# Patient Record
Sex: MALE | Race: WHITE | NOT HISPANIC OR LATINO | Employment: FULL TIME | URBAN - METROPOLITAN AREA
[De-identification: names, ages, dates, MRNs, and addresses within clinical notes are randomized per-mention and may not be internally consistent; named-entity substitution may affect disease eponyms.]

---

## 2017-11-09 ENCOUNTER — OFFICE VISIT - HEALTHEAST (OUTPATIENT)
Dept: FAMILY MEDICINE | Facility: CLINIC | Age: 37
End: 2017-11-09

## 2017-11-09 DIAGNOSIS — Z00.00 HEALTH CARE MAINTENANCE: ICD-10-CM

## 2017-11-09 RX ORDER — RIBOFLAVIN (VITAMIN B2) 100 MG
1 TABLET ORAL 3 TIMES DAILY
Status: SHIPPED | COMMUNITY
Start: 2017-11-09 | End: 2021-07-27

## 2017-11-09 RX ORDER — CHLORAL HYDRATE 500 MG
2 CAPSULE ORAL DAILY
Status: SHIPPED | COMMUNITY
Start: 2017-11-09 | End: 2021-07-27

## 2017-11-09 ASSESSMENT — MIFFLIN-ST. JEOR: SCORE: 1813.46

## 2018-12-13 ENCOUNTER — OFFICE VISIT - HEALTHEAST (OUTPATIENT)
Dept: FAMILY MEDICINE | Facility: CLINIC | Age: 38
End: 2018-12-13

## 2018-12-13 DIAGNOSIS — Z23 IMMUNIZATION DUE: ICD-10-CM

## 2018-12-13 DIAGNOSIS — Z00.00 HEALTH CARE MAINTENANCE: ICD-10-CM

## 2018-12-13 LAB
ALBUMIN SERPL-MCNC: 4.1 G/DL (ref 3.5–5)
ALP SERPL-CCNC: 67 U/L (ref 45–120)
ALT SERPL W P-5'-P-CCNC: 38 U/L (ref 0–45)
ANION GAP SERPL CALCULATED.3IONS-SCNC: 8 MMOL/L (ref 5–18)
AST SERPL W P-5'-P-CCNC: 35 U/L (ref 0–40)
BILIRUB SERPL-MCNC: 0.7 MG/DL (ref 0–1)
BUN SERPL-MCNC: 18 MG/DL (ref 8–22)
CALCIUM SERPL-MCNC: 9.5 MG/DL (ref 8.5–10.5)
CHLORIDE BLD-SCNC: 102 MMOL/L (ref 98–107)
CHOLEST SERPL-MCNC: 208 MG/DL
CO2 SERPL-SCNC: 28 MMOL/L (ref 22–31)
CREAT SERPL-MCNC: 0.97 MG/DL (ref 0.7–1.3)
ERYTHROCYTE [DISTWIDTH] IN BLOOD BY AUTOMATED COUNT: 10.6 % (ref 11–14.5)
FASTING STATUS PATIENT QL REPORTED: YES
GFR SERPL CREATININE-BSD FRML MDRD: >60 ML/MIN/1.73M2
GLUCOSE BLD-MCNC: 95 MG/DL (ref 70–125)
HCT VFR BLD AUTO: 48.6 % (ref 40–54)
HDLC SERPL-MCNC: 68 MG/DL
HGB BLD-MCNC: 16.6 G/DL (ref 14–18)
LDLC SERPL CALC-MCNC: 124 MG/DL
MCH RBC QN AUTO: 31.4 PG (ref 27–34)
MCHC RBC AUTO-ENTMCNC: 34.2 G/DL (ref 32–36)
MCV RBC AUTO: 92 FL (ref 80–100)
PLATELET # BLD AUTO: 187 THOU/UL (ref 140–440)
PMV BLD AUTO: 7.7 FL (ref 7–10)
POTASSIUM BLD-SCNC: 4.1 MMOL/L (ref 3.5–5)
PROT SERPL-MCNC: 6.7 G/DL (ref 6–8)
RBC # BLD AUTO: 5.29 MILL/UL (ref 4.4–6.2)
SODIUM SERPL-SCNC: 138 MMOL/L (ref 136–145)
TRIGL SERPL-MCNC: 80 MG/DL
WBC: 6.3 THOU/UL (ref 4–11)

## 2018-12-13 ASSESSMENT — MIFFLIN-ST. JEOR: SCORE: 1763.56

## 2018-12-14 LAB
25(OH)D3 SERPL-MCNC: 46.4 NG/ML (ref 30–80)
25(OH)D3 SERPL-MCNC: 46.4 NG/ML (ref 30–80)

## 2019-01-26 ENCOUNTER — RECORDS - HEALTHEAST (OUTPATIENT)
Dept: ADMINISTRATIVE | Facility: OTHER | Age: 39
End: 2019-01-26

## 2020-03-09 ENCOUNTER — OFFICE VISIT - HEALTHEAST (OUTPATIENT)
Dept: FAMILY MEDICINE | Facility: CLINIC | Age: 40
End: 2020-03-09

## 2020-03-09 DIAGNOSIS — Z00.00 ROUTINE GENERAL MEDICAL EXAMINATION AT A HEALTH CARE FACILITY: ICD-10-CM

## 2020-03-09 DIAGNOSIS — Z76.89 ENCOUNTER TO ESTABLISH CARE: ICD-10-CM

## 2020-03-09 LAB
CHOLEST SERPL-MCNC: 201 MG/DL
FASTING STATUS PATIENT QL REPORTED: YES
FASTING STATUS PATIENT QL REPORTED: YES
GLUCOSE BLD-MCNC: 100 MG/DL (ref 70–125)
HDLC SERPL-MCNC: 60 MG/DL
HGB BLD-MCNC: 17 G/DL (ref 14–18)
LDLC SERPL CALC-MCNC: 128 MG/DL
TRIGL SERPL-MCNC: 64 MG/DL

## 2020-03-09 ASSESSMENT — MIFFLIN-ST. JEOR: SCORE: 1812.43

## 2020-10-16 ENCOUNTER — RECORDS - HEALTHEAST (OUTPATIENT)
Dept: ADMINISTRATIVE | Facility: OTHER | Age: 40
End: 2020-10-16

## 2021-05-31 VITALS — WEIGHT: 202.4 LBS | BODY MASS INDEX: 29.98 KG/M2 | HEIGHT: 69 IN

## 2021-06-02 VITALS — WEIGHT: 191.4 LBS | BODY MASS INDEX: 28.35 KG/M2 | HEIGHT: 69 IN

## 2021-06-04 VITALS
BODY MASS INDEX: 29.82 KG/M2 | HEIGHT: 69 IN | SYSTOLIC BLOOD PRESSURE: 120 MMHG | HEART RATE: 68 BPM | DIASTOLIC BLOOD PRESSURE: 84 MMHG | WEIGHT: 201.3 LBS

## 2021-06-14 ENCOUNTER — OFFICE VISIT - HEALTHEAST (OUTPATIENT)
Dept: FAMILY MEDICINE | Facility: CLINIC | Age: 41
End: 2021-06-14

## 2021-06-14 DIAGNOSIS — Z00.00 ROUTINE GENERAL MEDICAL EXAMINATION AT A HEALTH CARE FACILITY: ICD-10-CM

## 2021-06-14 DIAGNOSIS — R10.31 RIGHT GROIN PAIN: ICD-10-CM

## 2021-06-14 DIAGNOSIS — M75.42 IMPINGEMENT SYNDROME OF SHOULDER REGION, LEFT: ICD-10-CM

## 2021-06-14 LAB
HBA1C MFR BLD: 5.3 %
HGB BLD-MCNC: 17 G/DL (ref 14–18)

## 2021-06-14 ASSESSMENT — MIFFLIN-ST. JEOR: SCORE: 1936.6

## 2021-06-14 NOTE — PROGRESS NOTES
Subjective:      Rocael Amos is a 37 y.o. male who presents today for physical exam    Mother recently diagnosed with stage 1 breast cancer with upcoming surgery next week at [place] and will start radiation.     Declines influenza vaccination today.   Patient is not fasting today.   Does have labwork completed annually through employer @ [place].    He does not have any acute concerns.     Reviewed and updated chart below.   No Known Allergies  No past medical history on file.  Past Surgical History:   Procedure Laterality Date     [place] KNEE SCOPE,DIAGNOSTIC      Description: Arthroscopy Knee Left;  Recorded: 09/18/2014;     SHOULDER SURGERY Right      Social History     Social History     Marital status: Single     Spouse name: N/A     Number of children: N/A     Years of education: N/A     Occupational History      [employer]     Social History Main Topics     Smoking status: Never Smoker     Smokeless tobacco: Never Used     Alcohol use Yes      Comment: 0-1/week     Drug use: No     Sexual activity: Yes     Partners: Female     Other Topics Concern     Not on file     Social History Narrative     Family History   Problem Relation Age of Onset     Hypertension Mother      Breast cancer Mother      Hypertension Father      Current Outpatient Prescriptions   Medication Sig Dispense Refill     glucosamine-chondroitin 500-400 mg tablet Take 1 tablet by mouth 3 (three) times a day.       MAGNESIUM ORAL Take 1,000 mg by mouth.       multivitamin therapeutic tablet Take 1 tablet by mouth daily.       OMEGA-3/DHA/EPA/FISH OIL (FISH OIL-OMEGA-3 FATTY ACIDS) 300-1,000 mg capsule Take 2 g by mouth daily.       No current facility-administered medications for this visit.      Patient Active Problem List    Diagnosis Date Noted     Health care maintenance 11/04/2016     Sebaceous Cyst      Review of Systems   Constitutional: Negative.   HENT: Negative.   Eyes: Negative.   Respiratory: Negative.   Cardiovascular:  "Negative.   Gastrointestinal: Negative.   Endocrine: Negative.   Genitourinary: Negative.   Musculoskeletal: Negative.   Skin: Negative.   Allergic/Immunologic: Negative.   Neurological: Negative.   Hematological: Negative.   Psychiatric/Behavioral: Negative.     Objective:    Physical Exam   /86 (Patient Site: Left Arm, Patient Position: Sitting, Cuff Size: Adult Regular)  Pulse 68  Ht 5' 9\" (1.753 m)  Wt 202 lb 6.4 oz (91.8 kg)  BMI 29.89 kg/m2    Constitutional: Alert and oriented x3, well nourished without any acute distress  HENT:   Right Ear: External ear normal.   Left Ear: External ear normal.   Nose: Nose normal.   Mouth/Throat: Oropharynx is clear and moist.   Eyes: Conjunctivae and EOM are normal. Pupils are equal, round, and reactive to light. Right eye exhibits no discharge. Left eye exhibits no discharge.   Neck: No thyromegaly present.   Lymphadenopathy: Without palpable lymphadenopathy  Cardiovascular: Normal S1 and S2. Regular rate, rhythm and no murmur, rubs or gallops. Palpable distal pulses bilaterally.  Pulmonary/Chest: Normal effort. Lungs clear to auscultation in all lobes. Without wheezing, rhonchi or crackles.    Abdominal: Soft, non tenderness. There is no rebound or guarding. Bowel sounds x4. Without organomegaly. No CVA tenderness.  Musculoskeletal: 5/5 strength  And full ROM in all extremities. No joint swelling or deformity  Neurological: Cranial nerves intact, without deficit. Without numbness, tingling or paresthesia. Normal reflexes  Skin: Dry and intact; without rashes or lesions.   Psychiatric: Normal mood and affect.     Assessment/Plan:      1. Health care maintenance  Reviewed 2016 labs which were normal.   Patient does have labs assessed through employer, advised patient to upload current records or to drop off records so file can be updated.   Per patient, all his last labs have been stable.   Vitamin D level was elevated in 2016. Patient did stop taking his daily " vitamin D supplementation. Declined offer to recheck today.   Declined influenza vaccination today.   Reviewed preventative care - we discussed healthy lifestyle, nutrition, cardiovascular risk reduction, self care, safety, self testicular exams, sunscreen, and seatbelt screening.  Recommended annual physical exam or sooner for any acute concerns.   Patient agreed with plan    Office visit and charting was completed with Zee Snyder, Student IKER Fernando CNP  11/9/2017

## 2021-06-15 LAB
CHOLEST SERPL-MCNC: 243 MG/DL
FASTING STATUS PATIENT QL REPORTED: NO
HDLC SERPL-MCNC: 51 MG/DL
LDLC SERPL CALC-MCNC: 158 MG/DL
TRIGL SERPL-MCNC: 169 MG/DL

## 2021-06-22 NOTE — PROGRESS NOTES
Assessment/Plan:        Diagnoses and all orders for this visit:    Health care maintenance.  Patient is fasting today.  Will order labs today.  Influenza and Td vaccines given today.   Reviewed preventative care - we discussed healthy lifestyle, nutrition, cardiovascular risk reduction, self care, safety, self testicular exams, sunscreen, and seatbelt screening.  Recommended annual physical exam or sooner for any acute concerns.   Discussed red flags that would warrant urgent evaluation.  Patient agreed with plan  -     HM2(CBC w/o Differential)  -     Comprehensive Metabolic Panel  -     Lipid Cascade  -     Vitamin D, Total (25-Hydroxy)      -     Td, Preservative Free (green label)    Subjective:    Patient ID: Rocael Amos is a 38 y.o. male.    HPI  Rocael presents at clinic today for annual physical exam.    He does not have any acute concerns.     Review of Systems   Constitutional: Negative.    HENT: Negative.    Eyes: Negative.    Cardiovascular: Negative.    Gastrointestinal: Negative.    Endocrine: Negative.    Genitourinary: Negative.    Musculoskeletal: Negative.    Skin: Negative.    Neurological: Negative.    Psychiatric/Behavioral: Negative.        Reviewed and updated chart below.   No Known Allergies  No past medical history on file.  Past Surgical History:   Procedure Laterality Date     [place] KNEE SCOPE,DIAGNOSTIC      Description: Arthroscopy Knee Left;  Recorded: 09/18/2014;     SHOULDER SURGERY Right      Social History     Social History     Marital status: Single     Spouse name: N/A     Number of children: N/A     Years of education: N/A     Occupational History      [employer]     Social History Main Topics     Smoking status: Never Smoker     Smokeless tobacco: Never Used     Alcohol use Yes      Comment: 0-1/week     Drug use: No     Sexual activity: Yes     Partners: Female     Other Topics Concern     Not on file     Social History Narrative     Family History   Problem Relation Age of  Onset     Hypertension Mother      Breast cancer Mother      Hypertension Father            Objective:    Physical Exam   Constitutional: He is oriented to person, place, and time. He appears well-developed and well-nourished.   HENT:   Head: Normocephalic and atraumatic.   Eyes: Conjunctivae are normal. Pupils are equal, round, and reactive to light.   Neck: Normal range of motion.   Cardiovascular: Normal rate, regular rhythm and normal heart sounds.   Pulmonary/Chest: Effort normal and breath sounds normal.   Abdominal: Soft.   Musculoskeletal: Normal range of motion.   Neurological: He is alert and oriented to person, place, and time.   Skin: Skin is warm and dry.   Psychiatric: He has a normal mood and affect. Judgment normal.           Current Outpatient Medications   Medication Sig Dispense Refill     glucosamine-chondroitin 500-400 mg tablet Take 1 tablet by mouth 3 (three) times a day.       multivitamin therapeutic tablet Take 1 tablet by mouth daily.       OMEGA-3/DHA/EPA/FISH OIL (FISH OIL-OMEGA-3 FATTY ACIDS) 300-1,000 mg capsule Take 2 g by mouth daily.       MAGNESIUM ORAL Take 1,000 mg by mouth.       No current facility-administered medications for this visit.      Vitals:    12/13/18 1118   BP: 120/76   Pulse: 68

## 2021-06-23 ENCOUNTER — RECORDS - HEALTHEAST (OUTPATIENT)
Dept: ADMINISTRATIVE | Facility: OTHER | Age: 41
End: 2021-06-23

## 2021-06-26 NOTE — PATIENT INSTRUCTIONS - HE
Acupuncture:  Acupuncture can help some people with various ailments such as pain, allergies, sleep issues, stress, anxiety, depression, and improve their overall wellness.   Some insurance will cover acupuncture. If your insurance covers it, please call our clinic and we will put in a formal referral. Otherwise, no referral needed generally. Just call to schedule at any of these sites:   [facility]:  Phone:  [phone]  [id number] [place] [address]    [facility]  [address] [place] [address]  Phone: [phone], [phone]  [email or web address]  Accepts [facility], [facility], [employer], [facility], [facility], and [facility]; other discount options for self pay available     [facility]   [address], [address], [place] [address]   Phone: [phone]

## 2021-06-27 ENCOUNTER — HEALTH MAINTENANCE LETTER (OUTPATIENT)
Age: 41
End: 2021-06-27

## 2021-06-28 NOTE — PROGRESS NOTES
"Progress Notes by Porsha Monahan MD at 3/9/2020  9:40 AM     Author: Porsha Monahan MD Service: -- Author Type: Physician    Filed: 3/9/2020 10:21 AM Encounter Date: 3/9/2020 Status: Signed    : Porsha Monahan MD (Physician)       MALE PREVENTATIVE EXAM    Assessment and Plan:       Rocael was seen today for annual exam and nevus.    Encounter to establish care    Routine general medical examination at a health care facility  -     Hemoglobin  -     Glucose  -     Lipid Cascade    Otherwise healthy. Reviewed ABCDEs of moles; his current mole appears benign and we will continue to monitor.     Next follow up:  Return in about 1 year (around 3/9/2021) for Annual physical.    Immunization Review  Adult Imm Review: No immunizations due today    I discussed the following with the patient:   Adult Healthy Living: Importance of regular exercise  Healthy nutrition  Getting adequate sleep  Stress management  Use of seat belts  Distracted driving  Sporting equipment safety  STI prevention  Supplement use  Herbal medications/alternative medical therapies    I have had an Advance Directives discussion with the patient.    Subjective:   Chief Complaint: Rocael Amos is an 39 y.o. male here for a preventative health visit.     HPI:    Chief Complaint   Patient presents with   ? Annual Exam     fasting   ? Nevus     would like a mole looked at on chest     Mole on anterior chest- been there for years but last summer noticed a grey hair from center, normally just brown hair. No change in the mole otherwise, doesn't bother him.     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   Typical weight is 185lbs, currently building muscle to 201lbs.   Eats clean. High protein and veggie diet.  Counts grams of protein (225g), eats 6 times per day (5oz chicken/cod/egg each meal) with regular veggies. 25g carbs per day.     He reports \"horrible teeth\"- root canals yearly- last year had an infection (ER visit). " Sonic toothbrush, floss, regular q6 month.    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    Exercises 1-2hr/day. Competitive body building.     No alcohol or smoking.    Currently single, Girlfriend of 6 years and he broke up 1 year ago 2019. They are still close and her kids visit.    He has cats.     Never smoker. No alcohol    Porsha Monahan MD     The 10-year ASCVD risk score (Jodieliza PEDROZA Jr., et al., 2013) is: 0.7%    Values used to calculate the score:      Age: 39 years      Sex: Male      Is Non- : No      Diabetic: No      Tobacco smoker: No      Systolic Blood Pressure: 120 mmHg      Is BP treated: No      HDL Cholesterol: 68 mg/dL      Total Cholesterol: 208 mg/dL    Healthy Habits  Are you taking a daily aspirin? No  Do you typically exercising at least 40 min, 3-4 times per week?  Yes  Do you usually eat at least 4 servings of fruit and vegetables a day, include whole grains and fiber and avoid regularly eating high fat foods? Yes  Have you had an eye exam in the past two years? Yes  Do you see a dentist twice per year? Yes  Do you have any concerns regarding sleep? No    Safety Screen  If you own firearms, are they secured in a locked gun cabinet or with trigger locks? The patient does not own any firearms  Do you feel you are safe where you are living?: Yes (3/9/2020  9:46 AM)  Do you feel you are safe in your relationship(s)?: Yes (3/9/2020  9:46 AM)      Review of Systems:  Please see above.  The rest of the review of systems are negative for all systems.     Cancer Screening     Patient has no health maintenance due at this time          Patient Care Team:  Porsha Monahan MD as PCP - General (Family Medicine)  Rosetta Fernando NP as Assigned PCP        History     Reviewed By Date/Time Sections Reviewed    Porsha Monahan MD  "3/9/2020 10:17 AM Medical, Surgical, Tobacco, Family    Porsha Monahan MD 3/9/2020 10:07 AM Social Documentation    Eri Radha AISLINN STEPHENSON 3/9/2020  9:46 AM Tobacco            Objective:   Vital Signs:   Visit Vitals  /84 (Patient Site: Left Arm, Patient Position: Sitting, Cuff Size: Adult Large)   Pulse 68   Ht 5' 9.25\" (1.759 m)   Wt 201 lb 4.8 oz (91.3 kg)   BMI 29.51 kg/m           PHYSICAL EXAM  Physical Exam:  Constitutional: Patient is oriented to person, place, and time. Patient appears well-developed and well-nourished. No distress.   Head: Normocephalic and atraumatic.   Ears: TMs normal bilaterally   Nose: no discharge or polyps  Mouth/Throat: Oropharynx is clear and moist. No oropharyngeal exudate. Dentition noted with crowns.  Eyes: Conjunctivae and EOM are normal. Pupils are equal, round, and reactive to light. No scleral icterus or discharge.  Neck: Neck supple. No JVD present. No tracheal deviation. No thyromegaly.     Cardiovascular: Normal rate, regular rhythm, normal heart sounds and intact distal pulses. No murmur heard.   Pulmonary/Chest: Effort normal and breath sounds are clear to auscultation bilaterally.  Abdominal: Soft. Bowel sounds are normal. Nodistension and no mass.  There is no tenderness.   : pt declined exam   Lymphadenopathy:  No cervical adenopathy.   Neurological: Alert and oriented to person, place, and time. 2+ patellar DTRs. No gross cranial nerve deficit. Coordination normal.   Skin: Skin is warm and dry. No rash noted.  Single mole pt points at is located at right anterior chest wall- 6mm in size, brown macule, with healthy hairs growing from it. No irregular border, no change in pigmentation.  Psychiatric:  Normal mood and affect. Judgment and thought content normal.   Speech is regular rate and rhythm.         The 10-year ASCVD risk score ([place] KASIA Jr., et al., 2013) is: 0.7%    Values used to calculate the score:      Age: 39 years      Sex: Male      Is " Non- : No      Diabetic: No      Tobacco smoker: No      Systolic Blood Pressure: 120 mmHg      Is BP treated: No      HDL Cholesterol: 68 mg/dL      Total Cholesterol: 208 mg/dL         Medication List          Accurate as of March 9, 2020 10:19 AM. If you have any questions, ask your nurse or doctor.            CONTINUE taking these medications    fish oil-omega-3 fatty acids 300-1,000 mg capsule  INSTRUCTIONS:  Take 2 g by mouth daily.        glucosamine-chondroitin 500-400 mg tablet  INSTRUCTIONS:  Take 1 tablet by mouth 3 (three) times a day.        multivitamin therapeutic tablet  INSTRUCTIONS:  Take 1 tablet by mouth daily.           STOP taking these medications    MAGNESIUM ORAL  Stopped by:  Porsha Monahan MD            Additional Screenings Completed Today:   PHQ2 is 0

## 2021-07-04 NOTE — PROGRESS NOTES
Progress Notes by Porsha Monahan MD at 6/14/2021  5:20 PM     Author: Porsha Monahan MD Service: -- Author Type: Physician    Filed: 6/14/2021  9:03 PM Encounter Date: 6/14/2021 Status: Signed    : Porsha Monahan MD (Physician)       MALE PREVENTATIVE EXAM    Assessment and Plan:     Patient has been advised of split billing requirements and indicates understanding: Yes    Rocael was seen today for annual exam, shoulder pain and groin pain.    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  Options for imaging though declines at this time and will start with physical therapy exercises he has learned previously and I will also place a referral should he desire this down the road.  He is also going to consider acupuncture.    -     Ambulatory referral to Adult PT- Internal    Right groin pain  Very tight hamstring muscles with limited range of motion.  Negative ALEX and FADIR testing.   No pain of the lower back specifically today however historically has had some right-sided low back pain and sciatica type symptoms.  No palpable inguinal hernia.  Testicular exam was normal.  Reviewed differential for groin pain which includes muscular versus sciatica versus hip pathology versus inguinal or femoral hernia or testicular origin.   Given onset of symptoms are particularly around exercise such as running or biking that he does not do regularly we have discussed increasing dynamic stretching and cooldown stretching to increase flexibility and he will work with  his chiropractor for this as well.  If symptoms do not improve with these conservative measures we will consider follow-up ultrasound for hernia or testicular assessment.      Next follow up:  Return in about 1 year (around 6/14/2022) for Annual physical, Recheck if sx not improved or sooner if worsening.    Immunization Review  Adult Imm Review: No immunizations due today    I discussed the following with the patient:   Adult Healthy Living: Importance of regular exercise  Healthy nutrition  Weight loss referral options  Getting adequate sleep  Stress management  Supplement use  Herbal medications/alternative medical therapies      Subjective:   Chief Complaint: Rocael Amos is an 41 y.o. male here for a preventative health visit.    Patient has been advised of split billing requirements and indicates understanding: Yes  HPI:    Chief Complaint   Patient presents with   ? Annual Exam     Not fasting   ? Shoulder Pain     left shoulder, pinching feeling, only gets it when doing chest exercises, does lift heavy   ? Groin Pain     right side, seem to wrap around to lower back, bothersome after biking but not during, possible hernia?        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 Anticipates cholesterol will be up.  230lbs currently, planning to cut to 185lbs.    Left shoulder pain for a few months- icing it nightly; stretching and trying to do anything possible to heal it. Pinching feeling only with chest exercises or front raises (but not side raises) of the shoulder- next day shoulder will be particularly stiff and takes NSAIDs, and biofreeze for 24-48hr until it gets better    Has tried to lower weight and higher reps (tried 15lb instead fo 25lb dumbells); for chest he would do 45lb instead of 60lbs for the chest flies and exercises    Has tried some shoulder exercises for a labral tear that he had on the right side in the past    Right sided groin pain: right testicle pain and groin area and  "wraps to the back- worse after biking but not during biking. Occasional but can come up for other times.  He doesn't do a lot of biking or running. Not noticing any budge as a hernia.     Had a friend die of testicular cancer recently so he is rightfully concerned.    Social History     Social History Narrative     for a [employer]. 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    Exercises 1-2hr/day. Competitive body building.     No alcohol or smoking.    Currently single    He has cats.     Never smoker. No alcohol    Porsha Monahan MD       Healthy Habits  Are you taking a daily aspirin? No  Do you typically exercising at least 40 min, 3-4 times per week?  Yes  Do you usually eat at least 4 servings of fruit and vegetables a day, include whole grains and fiber and avoid regularly eating high fat foods? Yes  Have you had an eye exam in the past two years? Yes  Do you see a dentist twice per year? Yes  Do you have any concerns regarding sleep? No    Safety Screen  If you own firearms, are they secured in a locked gun cabinet or with trigger locks? The patient does not own any firearms  No data recorded    Review of Systems:  Please see above.  The rest of the review of systems are negative for all systems.     Cancer Screening     Patient has no health maintenance due at this time          Patient Care Team:  Porsha Monahan MD as PCP - General (Family Medicine)  Porsha Monahan MD as Assigned PCP        History     Reviewed By Date/Time Sections Reviewed    Porsha Monahan MD 6/14/2021  8:58 PM Medical, Surgical, Tobacco, Family    Porsha Monahan MD 6/14/2021  5:41 PM Social Documentation    Radha Hwang LPN 6/14/2021  4:54 PM Tobacco            Objective:   Vital Signs:   Visit Vitals  /62 (Patient Site: Left Arm, Patient Position: Sitting, Cuff Size: Adult Large)   Pulse 76   Ht 5' 8.5\" (1.74 m)   Wt (!) 232 lb 6.4 oz " (105.4 kg)   BMI 34.82 kg/m           PHYSICAL EXAM  Physical Exam:  Constitutional: Patient is oriented to person, place, and time. Patient appears well-developed and well-nourished. No distress.   Head: Normocephalic and atraumatic.   Ears: TMs normal bilaterally   Nose: no discharge or polyps  Mouth/Throat: Oropharynx is clear and moist. No oropharyngeal exudate.   Eyes: Conjunctivae and EOM are normal. Pupils are equal, round, and reactive to light. No scleral icterus or discharge.  Neck: Neck supple. No JVD present. No tracheal deviation. No thyromegaly.     Cardiovascular: Normal rate, regular rhythm, normal heart sounds and intact distal pulses. No murmur heard.   Pulmonary/Chest: Effort normal and breath sounds are clear to auscultation bilaterally.  Abdominal: Soft. Bowel sounds are normal. Nodistension and no mass.  There is no tenderness.   : No inguinal or femoral hernia on valsalva maneuver. Testes are normal bilaterally, nontender with no palpable lumps; no erythema or swelling.   Lymphadenopathy:  No cervical adenopathy.   Neurological: Alert and oriented to person, place, and time. 2+ patellar DTRs. No gross cranial nerve deficit. Coordination normal.   Skin: Skin is warm and dry. No rash noted.   Psychiatric:  Normal mood and affect. Judgment and thought content normal.   Speech is regular rate and rhythm.   MSK: + impingement signs of the left shoulder (Neers, Arguello, painful arc); right shoulder is normal  Right hip nontender to palpation; there is reduced hip flexion/extension and internal/external rotation bilaterally, negative ALEX and FADIR testing     The 10-year ASCVD risk score (Jodi KASIA Jr., et al., 2013) is: 0.8%    Values used to calculate the score:      Age: 41 years      Sex: Male      Is Non- : No      Diabetic: No      Tobacco smoker: No      Systolic Blood Pressure: 102 mmHg      Is BP treated: No      HDL Cholesterol: 60 mg/dL      Total Cholesterol: 201  mg/dL         Medication List          Accurate as of June 14, 2021  9:02 PM. If you have any questions, ask your nurse or doctor.            CONTINUE taking these medications    fish oil-omega-3 fatty acids 300-1,000 mg capsule  INSTRUCTIONS: Take 2 g by mouth daily.        glucosamine-chondroitin 500-400 mg tablet  INSTRUCTIONS: Take 1 tablet by mouth 3 (three) times a day.        multivitamin therapeutic tablet  INSTRUCTIONS: Take 1 tablet by mouth daily.               Additional Screenings Completed Today:

## 2021-07-06 VITALS
DIASTOLIC BLOOD PRESSURE: 62 MMHG | BODY MASS INDEX: 34.42 KG/M2 | HEART RATE: 76 BPM | SYSTOLIC BLOOD PRESSURE: 102 MMHG | WEIGHT: 232.4 LBS | HEIGHT: 69 IN

## 2021-07-27 ENCOUNTER — OFFICE VISIT (OUTPATIENT)
Dept: FAMILY MEDICINE | Facility: CLINIC | Age: 41
End: 2021-07-27
Payer: COMMERCIAL

## 2021-07-27 VITALS
OXYGEN SATURATION: 96 % | SYSTOLIC BLOOD PRESSURE: 118 MMHG | BODY MASS INDEX: 33.32 KG/M2 | DIASTOLIC BLOOD PRESSURE: 64 MMHG | HEART RATE: 74 BPM | WEIGHT: 222.4 LBS | TEMPERATURE: 98 F

## 2021-07-27 DIAGNOSIS — R05.9 COUGH: Primary | ICD-10-CM

## 2021-07-27 PROCEDURE — 99213 OFFICE O/P EST LOW 20 MIN: CPT | Performed by: FAMILY MEDICINE

## 2021-07-27 RX ORDER — FLUTICASONE PROPIONATE 50 MCG
SPRAY, SUSPENSION (ML) NASAL
COMMUNITY
Start: 2021-06-23

## 2021-07-27 RX ORDER — OMEPRAZOLE 20 MG/1
20 TABLET, DELAYED RELEASE ORAL DAILY
Qty: 30 TABLET | Refills: 1 | Status: SHIPPED | OUTPATIENT
Start: 2021-07-27 | End: 2022-10-25

## 2021-07-27 NOTE — PROGRESS NOTES
Assessment/plan   Rocael Amos is a 41 year old male who is established to my practice.    Rocael was seen today for cough.    Diagnoses and all orders for this visit:    Cough  Chronic cough described as intermittent coughing attacks, no posttussive emesis.  Otherwise afebrile, feels really well.  DDx reviewed for chronic cough/upper airway cough syndrome.  Has already completed a course of fluticasone with some improvement but we did review possibility of heartburn/reflux playing a role especially as he is had a notable increase in the amount of food he has been eating as he is bulking for the next competition.  Given the lungs were clear and SPO2 normal, he is comfortable with the plan to do a course of omeprazole for 2 to 4 weeks and if not improving he can send a message and we will order an x-ray for ruling out a pneumonia at that point.  He is well past infectious.  If it had been Covid though this has been his only symptom we did review CDC recommendations despite vaccination with to still be testing for any new cough.  -     omeprazole (PRILOSEC OTC) 20 MG EC tablet; Take 1 tablet (20 mg) by mouth daily        Follow up: Return in about 4 weeks (around 8/24/2021) for Recheck if not improving.      Porsha Monahan MD    Subjective:      HPI: Rocael Amos is a 41 year old male who is here for:    Chief Complaint   Patient presents with     Cough     x 1 month.  Pt was seen at Urgent Care and was given a nasal spray and Benzonatate which have helped slightly.       Cough: He was seen on 6/23/2021 at the [facility] clinic for a cough that had started 6 days prior at that point- productive cough with mucus at that time.  At that visit he was offered Tessalon Perles and flonase.  There is some conversation about an allergic rhinitis component. The cough did improve a little but still has a cough during the day- coughing attacks that come and go every few hours.  We are now just over 1 month of coughing.    Has  tried cough drops.    Family hx of PNA in his dad    When working out, if the HR is above 140 he will feel the cough come on. But other less intense work outs do not cause the cough.  But can also happen at rest when sitting or talking at a meeting.        Has gone to the gym daily and nobody there seems to have gotten sick (works out with the same people daily)    No heartburn or reflux lately but he is bulking for the next competition and He is eating a lot now- 6-7 meals a day.   Will clear throat more in the mronings    Hx of allergies but no asthma        Review of Systems:  No fever, shortness of breath, wheezing.  No rash  No change in taste/sense of smell  No hematemesis feeling well, no swollen lymph nodes anywhere  The shoulder and hamstring issues we reviewed last month are starting to improve with stretching.  12 point comprehensive review of systems was negative except as noted and HPI     Social History:  Social History     Social History Narrative     for a [employer]. 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  Exercises 1-2hr/day. Competitive body building.   No alcohol or smoking.  Currently single  He has cats.   Never smoker. No alcohol  Lucio Monahan MD         Medical History:  Patient Active Problem List   Diagnosis     Health care maintenance     Ulnar neuropathy at elbow     Right shoulder pain     Past Medical History:   Diagnosis Date     Right shoulder pain 5/18/2014    Overview:  Scapular dyskinesis, and working on exercises for this as the primary cause of his symptoms. Thorough work up with [facility] 5/2014 with MRI scans of the right shoulder showed some labral degeneration, and brachial plexus MRI negative for thoracic outlet syndrome as the cause of his symptoms.     Past Surgical History:   Procedure Laterality Date      KNEE SCOPE, DIAGNOSTIC      Description: Arthroscopy Knee Left;  Recorded: 09/18/2014;      SHOULDER SURGERY Right      Kiwi [kiwi extract]  Family History   Problem Relation Age of Onset     Hypertension Mother      Breast Cancer Mother 61.00     Hypertension Father      Colon Cancer No family hx of      Prostate Cancer No family hx of        Medications:  Current Outpatient Medications   Medication     fluticasone (FLONASE) 50 MCG/ACT nasal spray     omeprazole (PRILOSEC OTC) 20 MG EC tablet     No current facility-administered medications for this visit.         Imaging & Labs reviewed this visit: none      Objective:      Vitals:    07/27/21 1320   BP: 118/64   Pulse: 74   Temp: 98  F (36.7  C)   TempSrc: Oral   SpO2: 96%   Weight: 100.9 kg (222 lb 6.4 oz)       Physical Exam:     General: Alert, no acute distress.   HEENT: normocephalic conjunctivae are clear, Normal pearly TMs bilaterally without erythema, pus or fluid. Position and landmarks are normal.  Nose is clear.    Neck: supple without adenopathy or thyromegaly.  Lungs: Good aeration bilaterally. Clear to auscultation without wheezes, rales or rhonci.   Heart: regular rate and rhythm, normal S1 and S2, no murmurs  Abdomen: soft and nontender  Skin: clear without rash or lesions  Neuro: alert, interactive moving all extremities equally, normal muscle tone in all 4 extremities    Porsha Monahan MD

## 2021-08-18 DIAGNOSIS — R05.9 COUGH: ICD-10-CM

## 2021-10-17 ENCOUNTER — HEALTH MAINTENANCE LETTER (OUTPATIENT)
Age: 41
End: 2021-10-17

## 2021-12-10 ENCOUNTER — TRANSFERRED RECORDS (OUTPATIENT)
Dept: HEALTH INFORMATION MANAGEMENT | Facility: CLINIC | Age: 41
End: 2021-12-10
Payer: COMMERCIAL

## 2021-12-17 ENCOUNTER — TRANSFERRED RECORDS (OUTPATIENT)
Dept: HEALTH INFORMATION MANAGEMENT | Facility: CLINIC | Age: 41
End: 2021-12-17
Payer: COMMERCIAL

## 2022-07-23 ENCOUNTER — HEALTH MAINTENANCE LETTER (OUTPATIENT)
Age: 42
End: 2022-07-23

## 2022-10-01 ENCOUNTER — HEALTH MAINTENANCE LETTER (OUTPATIENT)
Age: 42
End: 2022-10-01

## 2022-10-25 ENCOUNTER — OFFICE VISIT (OUTPATIENT)
Dept: FAMILY MEDICINE | Facility: CLINIC | Age: 42
End: 2022-10-25
Payer: COMMERCIAL

## 2022-10-25 VITALS
HEART RATE: 76 BPM | DIASTOLIC BLOOD PRESSURE: 90 MMHG | WEIGHT: 233.3 LBS | HEIGHT: 70 IN | BODY MASS INDEX: 33.4 KG/M2 | SYSTOLIC BLOOD PRESSURE: 138 MMHG

## 2022-10-25 DIAGNOSIS — Z11.4 SCREENING FOR HIV (HUMAN IMMUNODEFICIENCY VIRUS): ICD-10-CM

## 2022-10-25 DIAGNOSIS — S86.011S ACHILLES TENDON TEAR, RIGHT, SEQUELA: ICD-10-CM

## 2022-10-25 DIAGNOSIS — Z11.59 NEED FOR HEPATITIS C SCREENING TEST: ICD-10-CM

## 2022-10-25 DIAGNOSIS — Z00.00 ROUTINE GENERAL MEDICAL EXAMINATION AT A HEALTH CARE FACILITY: Primary | ICD-10-CM

## 2022-10-25 DIAGNOSIS — R10.31 RIGHT GROIN PAIN: ICD-10-CM

## 2022-10-25 DIAGNOSIS — I82.441 ACUTE DEEP VEIN THROMBOSIS (DVT) OF TIBIAL VEIN OF RIGHT LOWER EXTREMITY (H): ICD-10-CM

## 2022-10-25 DIAGNOSIS — I82.451 ACUTE DEEP VEIN THROMBOSIS (DVT) OF RIGHT PERONEAL VEIN (H): ICD-10-CM

## 2022-10-25 LAB
CHOLEST SERPL-MCNC: 182 MG/DL
FASTING STATUS PATIENT QL REPORTED: ABNORMAL
GLUCOSE SERPL-MCNC: 102 MG/DL (ref 70–99)
HDLC SERPL-MCNC: 41 MG/DL
HGB BLD-MCNC: 15.7 G/DL (ref 13.3–17.7)
LDLC SERPL CALC-MCNC: 122 MG/DL
NONHDLC SERPL-MCNC: 141 MG/DL
TRIGL SERPL-MCNC: 96 MG/DL

## 2022-10-25 PROCEDURE — 80061 LIPID PANEL: CPT | Performed by: FAMILY MEDICINE

## 2022-10-25 PROCEDURE — 85018 HEMOGLOBIN: CPT | Performed by: FAMILY MEDICINE

## 2022-10-25 PROCEDURE — 82947 ASSAY GLUCOSE BLOOD QUANT: CPT | Mod: 59 | Performed by: FAMILY MEDICINE

## 2022-10-25 PROCEDURE — 87389 HIV-1 AG W/HIV-1&-2 AB AG IA: CPT | Performed by: FAMILY MEDICINE

## 2022-10-25 PROCEDURE — 90686 IIV4 VACC NO PRSV 0.5 ML IM: CPT | Performed by: FAMILY MEDICINE

## 2022-10-25 PROCEDURE — 91312 COVID-19,PF,PFIZER BOOSTER BIVALENT: CPT | Performed by: FAMILY MEDICINE

## 2022-10-25 PROCEDURE — 99396 PREV VISIT EST AGE 40-64: CPT | Mod: 25 | Performed by: FAMILY MEDICINE

## 2022-10-25 PROCEDURE — 99213 OFFICE O/P EST LOW 20 MIN: CPT | Mod: 25 | Performed by: FAMILY MEDICINE

## 2022-10-25 PROCEDURE — 80048 BASIC METABOLIC PNL TOTAL CA: CPT | Performed by: FAMILY MEDICINE

## 2022-10-25 PROCEDURE — 86803 HEPATITIS C AB TEST: CPT | Performed by: FAMILY MEDICINE

## 2022-10-25 PROCEDURE — 36415 COLL VENOUS BLD VENIPUNCTURE: CPT | Performed by: FAMILY MEDICINE

## 2022-10-25 PROCEDURE — 90471 IMMUNIZATION ADMIN: CPT | Performed by: FAMILY MEDICINE

## 2022-10-25 PROCEDURE — 0124A COVID-19,PF,PFIZER BOOSTER BIVALENT: CPT | Performed by: FAMILY MEDICINE

## 2022-10-25 RX ORDER — APIXABAN 5 MG (74)
5 KIT ORAL 2 TIMES DAILY
Status: CANCELLED | OUTPATIENT
Start: 2022-10-25

## 2022-10-25 RX ORDER — APIXABAN 5 MG (74)
5 KIT ORAL 2 TIMES DAILY
COMMUNITY
Start: 2022-10-18 | End: 2023-11-22

## 2022-10-25 ASSESSMENT — ENCOUNTER SYMPTOMS
PARESTHESIAS: 0
DYSURIA: 0
HEMATOCHEZIA: 0
WEAKNESS: 0
NAUSEA: 0
MYALGIAS: 0
SORE THROAT: 0
NERVOUS/ANXIOUS: 0
CHILLS: 0
SHORTNESS OF BREATH: 0
HEMATURIA: 0
ARTHRALGIAS: 0
CONSTIPATION: 0
JOINT SWELLING: 0
FREQUENCY: 0
ABDOMINAL PAIN: 0
FEVER: 0
HEARTBURN: 0
DIARRHEA: 0
HEADACHES: 0
COUGH: 0
DIZZINESS: 0
EYE PAIN: 0
PALPITATIONS: 0
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
## 2022-10-26 LAB
HCV AB SERPL QL IA: NONREACTIVE
HIV 1+2 AB+HIV1 P24 AG SERPL QL IA: NONREACTIVE

## 2022-10-27 LAB
ANION GAP SERPL CALCULATED.3IONS-SCNC: 17 MMOL/L (ref 7–15)
BUN SERPL-MCNC: 19.4 MG/DL (ref 6–20)
CALCIUM SERPL-MCNC: 9.1 MG/DL (ref 8.6–10)
CHLORIDE SERPL-SCNC: 95 MMOL/L (ref 98–107)
CREAT SERPL-MCNC: 0.9 MG/DL (ref 0.67–1.17)
DEPRECATED HCO3 PLAS-SCNC: 18 MMOL/L (ref 22–29)
GFR SERPL CREATININE-BSD FRML MDRD: >90 ML/MIN/1.73M2
GLUCOSE SERPL-MCNC: 92 MG/DL (ref 70–99)
POTASSIUM SERPL-SCNC: 3.8 MMOL/L (ref 3.4–5.3)
SODIUM SERPL-SCNC: 130 MMOL/L (ref 136–145)

## 2023-01-27 ENCOUNTER — APPOINTMENT (OUTPATIENT)
Dept: URBAN - METROPOLITAN AREA CLINIC 260 | Age: 43
Setting detail: DERMATOLOGY
End: 2023-01-27

## 2023-01-27 VITALS — HEIGHT: 69 IN | WEIGHT: 230 LBS

## 2023-01-27 DIAGNOSIS — L82.1 OTHER SEBORRHEIC KERATOSIS: ICD-10-CM

## 2023-01-27 DIAGNOSIS — D18.0 HEMANGIOMA: ICD-10-CM

## 2023-01-27 DIAGNOSIS — D22 MELANOCYTIC NEVI: ICD-10-CM

## 2023-01-27 PROBLEM — D22.5 MELANOCYTIC NEVI OF TRUNK: Status: ACTIVE | Noted: 2023-01-27

## 2023-01-27 PROBLEM — D23.72 OTHER BENIGN NEOPLASM OF SKIN OF LEFT LOWER LIMB, INCLUDING HIP: Status: ACTIVE | Noted: 2023-01-27

## 2023-01-27 PROBLEM — D18.01 HEMANGIOMA OF SKIN AND SUBCUTANEOUS TISSUE: Status: ACTIVE | Noted: 2023-01-27

## 2023-01-27 PROCEDURE — OTHER COUNSELING: OTHER

## 2023-01-27 PROCEDURE — 99203 OFFICE O/P NEW LOW 30 MIN: CPT

## 2023-01-27 PROCEDURE — OTHER MIPS QUALITY: OTHER

## 2023-01-27 ASSESSMENT — LOCATION SIMPLE DESCRIPTION DERM
LOCATION SIMPLE: ABDOMEN
LOCATION SIMPLE: UPPER BACK
LOCATION SIMPLE: LEFT UPPER BACK

## 2023-01-27 ASSESSMENT — LOCATION DETAILED DESCRIPTION DERM
LOCATION DETAILED: LEFT MID-UPPER BACK
LOCATION DETAILED: XIPHOID
LOCATION DETAILED: SUPERIOR THORACIC SPINE

## 2023-01-27 ASSESSMENT — LOCATION ZONE DERM: LOCATION ZONE: TRUNK

## 2023-06-06 ENCOUNTER — APPOINTMENT (OUTPATIENT)
Dept: URBAN - METROPOLITAN AREA CLINIC 260 | Age: 43
Setting detail: DERMATOLOGY
End: 2023-06-07

## 2023-06-06 VITALS — WEIGHT: 230 LBS | HEIGHT: 60 IN

## 2023-06-06 DIAGNOSIS — L44.8 OTHER SPECIFIED PAPULOSQUAMOUS DISORDERS: ICD-10-CM

## 2023-06-06 DIAGNOSIS — D18.0 HEMANGIOMA: ICD-10-CM

## 2023-06-06 DIAGNOSIS — L82.1 OTHER SEBORRHEIC KERATOSIS: ICD-10-CM

## 2023-06-06 DIAGNOSIS — Z71.89 OTHER SPECIFIED COUNSELING: ICD-10-CM

## 2023-06-06 DIAGNOSIS — D22 MELANOCYTIC NEVI: ICD-10-CM

## 2023-06-06 DIAGNOSIS — L81.4 OTHER MELANIN HYPERPIGMENTATION: ICD-10-CM

## 2023-06-06 PROBLEM — D22.5 MELANOCYTIC NEVI OF TRUNK: Status: ACTIVE | Noted: 2023-06-06

## 2023-06-06 PROBLEM — D18.01 HEMANGIOMA OF SKIN AND SUBCUTANEOUS TISSUE: Status: ACTIVE | Noted: 2023-06-06

## 2023-06-06 PROCEDURE — OTHER OTC TREATMENT REGIMEN: OTHER

## 2023-06-06 PROCEDURE — OTHER MIPS QUALITY: OTHER

## 2023-06-06 PROCEDURE — OTHER COUNSELING: OTHER

## 2023-06-06 PROCEDURE — 99213 OFFICE O/P EST LOW 20 MIN: CPT

## 2023-06-06 ASSESSMENT — LOCATION SIMPLE DESCRIPTION DERM
LOCATION SIMPLE: CHEST
LOCATION SIMPLE: LEFT SHOULDER
LOCATION SIMPLE: RIGHT SHOULDER
LOCATION SIMPLE: SCALP
LOCATION SIMPLE: UPPER BACK

## 2023-06-06 ASSESSMENT — LOCATION DETAILED DESCRIPTION DERM
LOCATION DETAILED: INFERIOR THORACIC SPINE
LOCATION DETAILED: LEFT SUPERIOR PARIETAL SCALP
LOCATION DETAILED: STERNUM
LOCATION DETAILED: LEFT POSTERIOR SHOULDER
LOCATION DETAILED: RIGHT POSTERIOR SHOULDER

## 2023-06-06 ASSESSMENT — LOCATION ZONE DERM
LOCATION ZONE: ARM
LOCATION ZONE: TRUNK
LOCATION ZONE: SCALP

## 2023-09-18 ENCOUNTER — OFFICE VISIT (OUTPATIENT)
Dept: FAMILY MEDICINE | Facility: CLINIC | Age: 43
End: 2023-09-18
Payer: COMMERCIAL

## 2023-09-18 VITALS
BODY MASS INDEX: 32.82 KG/M2 | OXYGEN SATURATION: 97 % | SYSTOLIC BLOOD PRESSURE: 130 MMHG | DIASTOLIC BLOOD PRESSURE: 80 MMHG | TEMPERATURE: 98.5 F | WEIGHT: 229.28 LBS | HEART RATE: 80 BPM | HEIGHT: 70 IN

## 2023-09-18 DIAGNOSIS — L72.3 SEBACEOUS CYST: Primary | ICD-10-CM

## 2023-09-18 PROCEDURE — 99213 OFFICE O/P EST LOW 20 MIN: CPT | Performed by: STUDENT IN AN ORGANIZED HEALTH CARE EDUCATION/TRAINING PROGRAM

## 2023-09-18 ASSESSMENT — PAIN SCALES - GENERAL: PAINLEVEL: NO PAIN (0)

## 2023-09-18 NOTE — PROGRESS NOTES
Assessment and Plan   43-year-old male who presents with a subcutaneous cyst just below his chin on the right.  Very small approximately 0.5 cm on exam.  Nontender with no skin changes.  Given description and exam I think this is most likely a sebaceous cyst but other etiologies possible.  Reassured patient of its benign nature.  He may be interested in the future having this removed but for now he will monitor.    1. Sebaceous cyst    Follow up: PRN    Options for treatment and follow-up care were reviewed with the patient and/or guardian. Rocael Amos and/or guardian engaged in the decision making process and verbalized understanding of the options discussed and agreed with the final plan.    Dr. Jarrett Guerrero         HPI:   Rocael Amos is a 43 year old  male who presents for:    Chief Complaint   Patient presents with    lump on neck     Patient noticed a lump in his neck approximately a month and a half ago.  Originally thought this was a pimple but did not seem to go away.  Now he feels a small nodule under the skin just below his chin on the right.  No pain or discharge.  No skin changes.  Never had something like this before.         PMHX:     Patient Active Problem List   Diagnosis    Health care maintenance    Ulnar neuropathy at elbow    Right shoulder pain       Social History     Tobacco Use    Smoking status: Never    Smokeless tobacco: Never   Substance Use Topics    Alcohol use: Yes     Comment: Alcoholic Drinks/day: 0-1/week    Drug use: No       Social History     Social History Narrative     for a [employer]. 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  Exercises 1-2hr/day. Competitive body building.   No alcohol or smoking.  Currently single.  He has cats.   Never smoker. No alcohol  Porsha Monahan MD         Allergies   Allergen Reactions    Kiwi [Kiwi Extract] Anaphylaxis     Throat felt like it was closing and eyes were swollen  "      No results found for this or any previous visit (from the past 24 hour(s)).         Review of Systems:    ROS: 10 point ROS neg other than the symptoms noted above in the HPI.         Physical Exam:     Vitals:    09/18/23 0943   BP: 130/80   Pulse: 80   Temp: 98.5  F (36.9  C)   SpO2: 97%   Weight: 104 kg (229 lb 4.5 oz)   Height: 1.765 m (5' 9.5\")     Body mass index is 33.37 kg/m .    General appearance: Alert, cooperative, no distress, appears stated age  Head: Normocephalic, atraumatic, without obvious abnormality  Eyes: Pupils equal round, reactive.  Conjunctiva clear.  Nose: Nares normal, no drainage.  Throat: Lips, mucosa, tongue normal mucosa pink and moist  Neck: Supple, symmetric, trachea midline  Skin: approximately 0.5 cm subcutaneous nodule just under chin on right that is nontender to palpation with no skin changes. POCUS shows a small subcutaneous hypoechoic cyst appearing lesion        "

## 2023-11-22 ENCOUNTER — OFFICE VISIT (OUTPATIENT)
Dept: FAMILY MEDICINE | Facility: CLINIC | Age: 43
End: 2023-11-22
Payer: COMMERCIAL

## 2023-11-22 VITALS
WEIGHT: 216.2 LBS | HEIGHT: 69 IN | BODY MASS INDEX: 32.02 KG/M2 | SYSTOLIC BLOOD PRESSURE: 130 MMHG | DIASTOLIC BLOOD PRESSURE: 88 MMHG | OXYGEN SATURATION: 97 % | HEART RATE: 96 BPM

## 2023-11-22 DIAGNOSIS — Z00.00 ROUTINE GENERAL MEDICAL EXAMINATION AT A HEALTH CARE FACILITY: Primary | ICD-10-CM

## 2023-11-22 DIAGNOSIS — F41.8 SITUATIONAL ANXIETY: ICD-10-CM

## 2023-11-22 DIAGNOSIS — M77.11 RIGHT TENNIS ELBOW: ICD-10-CM

## 2023-11-22 LAB
ANION GAP SERPL CALCULATED.3IONS-SCNC: 11 MMOL/L (ref 7–15)
BUN SERPL-MCNC: 17.4 MG/DL (ref 6–20)
CALCIUM SERPL-MCNC: 9.6 MG/DL (ref 8.6–10)
CHLORIDE SERPL-SCNC: 101 MMOL/L (ref 98–107)
CHOLEST SERPL-MCNC: 236 MG/DL
CREAT SERPL-MCNC: 1 MG/DL (ref 0.67–1.17)
DEPRECATED HCO3 PLAS-SCNC: 25 MMOL/L (ref 22–29)
EGFRCR SERPLBLD CKD-EPI 2021: >90 ML/MIN/1.73M2
GLUCOSE SERPL-MCNC: 113 MG/DL (ref 70–99)
HBA1C MFR BLD: 5.4 % (ref 0–5.6)
HDLC SERPL-MCNC: 58 MG/DL
HGB BLD-MCNC: 17.1 G/DL (ref 13.3–17.7)
LDLC SERPL CALC-MCNC: 149 MG/DL
NONHDLC SERPL-MCNC: 178 MG/DL
POTASSIUM SERPL-SCNC: 4.5 MMOL/L (ref 3.4–5.3)
SODIUM SERPL-SCNC: 137 MMOL/L (ref 135–145)
TRIGL SERPL-MCNC: 143 MG/DL
TSH SERPL DL<=0.005 MIU/L-ACNC: 3.03 UIU/ML (ref 0.3–4.2)

## 2023-11-22 PROCEDURE — 84443 ASSAY THYROID STIM HORMONE: CPT | Performed by: FAMILY MEDICINE

## 2023-11-22 PROCEDURE — 99213 OFFICE O/P EST LOW 20 MIN: CPT | Mod: 25 | Performed by: FAMILY MEDICINE

## 2023-11-22 PROCEDURE — 90686 IIV4 VACC NO PRSV 0.5 ML IM: CPT | Performed by: FAMILY MEDICINE

## 2023-11-22 PROCEDURE — 86706 HEP B SURFACE ANTIBODY: CPT | Performed by: FAMILY MEDICINE

## 2023-11-22 PROCEDURE — 80061 LIPID PANEL: CPT | Performed by: FAMILY MEDICINE

## 2023-11-22 PROCEDURE — 36415 COLL VENOUS BLD VENIPUNCTURE: CPT | Performed by: FAMILY MEDICINE

## 2023-11-22 PROCEDURE — 85018 HEMOGLOBIN: CPT | Performed by: FAMILY MEDICINE

## 2023-11-22 PROCEDURE — 99396 PREV VISIT EST AGE 40-64: CPT | Mod: 25 | Performed by: FAMILY MEDICINE

## 2023-11-22 PROCEDURE — 90471 IMMUNIZATION ADMIN: CPT | Performed by: FAMILY MEDICINE

## 2023-11-22 PROCEDURE — 91320 SARSCV2 VAC 30MCG TRS-SUC IM: CPT | Performed by: FAMILY MEDICINE

## 2023-11-22 PROCEDURE — 83036 HEMOGLOBIN GLYCOSYLATED A1C: CPT | Performed by: FAMILY MEDICINE

## 2023-11-22 PROCEDURE — 90480 ADMN SARSCOV2 VAC 1/ONLY CMP: CPT | Performed by: FAMILY MEDICINE

## 2023-11-22 PROCEDURE — 80048 BASIC METABOLIC PNL TOTAL CA: CPT | Performed by: FAMILY MEDICINE

## 2023-11-22 ASSESSMENT — ANXIETY QUESTIONNAIRES
5. BEING SO RESTLESS THAT IT IS HARD TO SIT STILL: SEVERAL DAYS
6. BECOMING EASILY ANNOYED OR IRRITABLE: NOT AT ALL
GAD7 TOTAL SCORE: 4
7. FEELING AFRAID AS IF SOMETHING AWFUL MIGHT HAPPEN: NOT AT ALL
2. NOT BEING ABLE TO STOP OR CONTROL WORRYING: SEVERAL DAYS
GAD7 TOTAL SCORE: 4
3. WORRYING TOO MUCH ABOUT DIFFERENT THINGS: NOT AT ALL
1. FEELING NERVOUS, ANXIOUS, OR ON EDGE: SEVERAL DAYS
IF YOU CHECKED OFF ANY PROBLEMS ON THIS QUESTIONNAIRE, HOW DIFFICULT HAVE THESE PROBLEMS MADE IT FOR YOU TO DO YOUR WORK, TAKE CARE OF THINGS AT HOME, OR GET ALONG WITH OTHER PEOPLE: SOMEWHAT DIFFICULT

## 2023-11-22 ASSESSMENT — ENCOUNTER SYMPTOMS
HEADACHES: 0
PALPITATIONS: 0
HEMATURIA: 0
HEMATOCHEZIA: 0
SORE THROAT: 0
NERVOUS/ANXIOUS: 1
MYALGIAS: 1
SHORTNESS OF BREATH: 0
HEARTBURN: 0
FREQUENCY: 0
ABDOMINAL PAIN: 0
PARESTHESIAS: 0
CHILLS: 0
EYE PAIN: 0
JOINT SWELLING: 0
DYSURIA: 0
COUGH: 0
CONSTIPATION: 0
ARTHRALGIAS: 1
FEVER: 0
DIZZINESS: 0
DIARRHEA: 0
NAUSEA: 0
WEAKNESS: 0

## 2023-11-22 ASSESSMENT — PATIENT HEALTH QUESTIONNAIRE - PHQ9
SUM OF ALL RESPONSES TO PHQ QUESTIONS 1-9: 5
5. POOR APPETITE OR OVEREATING: SEVERAL DAYS

## 2023-11-22 NOTE — PROGRESS NOTES
SUBJECTIVE:   Rocael is a 43 year old, presenting for the following:  Physical (Finished PT for achilles tear back in May and is back to running. 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 Hx of labrum tear in right shoulder. ), and Anxiety (Had traumatic experience with cat recently and realized he has a problem with anxiety- had trouble sleeping and functioning. )        11/22/2023     7:06 AM   Additional Questions   Roomed by Radha KRISHNAMURTHY LPN       Healthy Habits:     Getting at least 3 servings of Calcium per day:  Yes    Bi-annual eye exam:  Yes    Dental care twice a year:  Yes    Sleep apnea or symptoms of sleep apnea:  None    Diet:  Regular (no restrictions)    Frequency of exercise:  6-7 days/week    Duration of exercise:  Greater than 60 minutes    Taking medications regularly:  Yes    Medication side effects:  None    Additional concerns today:  Yes      Chief Complaint   Patient presents with    Physical     Finished PT for achilles tear back in May and is back to running.     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 Hx of labrum tear in right shoulder.     Anxiety     Had traumatic experience with cat recently and realized he has a problem with anxiety- had trouble sleeping and functioning.      He recently became aware he has underlying anxiety- used to manage it well with gym workouts but recently his cat had an ER visit (urinary blockage- cat was howling so loud) and had situational worsening in his anxiety- trouble sleeping, worried, ruminating and didn't want to leave his cat alone after the hospitalization for the cat's health. This was 2-3 weeks of increased anxiety. He felt very panicky in the moment at the ER with all the unknowns of the cat's health condition.    He stopped his B12 supplement    He is moving away  "from lifting and looking to get back into endurance running. Plant based diet, has been a focus for him as well; still eating chicken/fish; less red meat  Snacks: lentils/beans etc    Right forearm pain: chiropractic and acupuncture has helped in the past 1 year for this; has a lot of tension and radiate sup the arm and into his shoulder; the arm muscles get tight; does heavy lifting and a lot of computer work (10-12hr days); icing, stretching, has tried a copper sleeve and the brace for his forearm   Nagging pain    Interested in the [employer] when he turns 44    On 10/17/2022 he was seen at the urgent care for deep vein thrombosis in his right leg due to a recent Achilles tear.  Completed 3 month course of Eliquis     Wt Readings from Last 3 Encounters:   11/22/23 98.1 kg (216 lb 3.2 oz)   09/18/23 104 kg (229 lb 4.5 oz)   10/25/22 105.8 kg (233 lb 4.8 oz)         Social History     Social History Narrative     for a [employer]. 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  Exercises 1-2hr/day. Competitive body building.   No alcohol or smoking.  Currently single.  He has cats.   Never smoker. No alcohol  Porsha Monahan MD         Social History     Tobacco Use    Smoking status: Never    Smokeless tobacco: Never   Substance Use Topics    Alcohol use: Yes     Comment: Alcoholic Drinks/day: 0-1/week         11/22/2023     7:00 AM   Alcohol Use   Prescreen: >3 drinks/day or >7 drinks/week? No          No data to display                Last PSA: No results found for: \"PSA\"    Reviewed orders with patient. Reviewed health maintenance and updated orders accordingly - Yes      Reviewed and updated as needed this visit by clinical staff   Tobacco  Allergies  Meds              Reviewed and updated as needed this visit by Provider                     Review of Systems   Constitutional:  Negative for chills and fever.   HENT:  Negative for congestion, ear pain, " "hearing loss and sore throat.    Eyes:  Negative for pain and visual disturbance.   Respiratory:  Negative for cough and shortness of breath.    Cardiovascular:  Negative for chest pain, palpitations and peripheral edema.   Gastrointestinal:  Negative for abdominal pain, constipation, diarrhea, heartburn, hematochezia and nausea.   Genitourinary:  Negative for dysuria, frequency, genital sores, hematuria, impotence, penile discharge and urgency.   Musculoskeletal:  Positive for arthralgias and myalgias. Negative for joint swelling.   Skin:  Negative for rash.   Neurological:  Negative for dizziness, weakness, headaches and paresthesias.   Psychiatric/Behavioral:  Negative for mood changes. The patient is nervous/anxious.        OBJECTIVE:   /88 (BP Location: Left arm, Patient Position: Sitting, Cuff Size: Adult Large)   Pulse 96   Ht 1.761 m (5' 9.33\")   Wt 98.1 kg (216 lb 3.2 oz)   SpO2 97%   BMI 31.62 kg/m      Physical Exam  GENERAL: healthy, alert and no distress  EYES: Eyes grossly normal to inspection, PERRL and conjunctivae and sclerae normal  HENT: ear canals and TM's normal, nose and mouth without ulcers or lesions  NECK: no adenopathy, no asymmetry, masses, or scars and thyroid normal to palpation  RESP: lungs clear to auscultation - no rales, rhonchi or wheezes  CV: regular rate and rhythm, normal S1 S2, no S3 or S4, no murmur, click or rub, no peripheral edema and peripheral pulses strong  ABDOMEN: soft, nontender, no hepatosplenomegaly, no masses and bowel sounds normal  MS: no gross musculoskeletal defects noted, no edema  SKIN: no suspicious lesions or rashes  NEURO: Normal strength and tone, mentation intact and speech normal  PSYCH: mentation appears normal, affect normal/bright   (male): normal male genitalia without lesions or urethral discharge, no hernia, testes normal to palpation           ASSESSMENT/PLAN:   Rocael was seen today for physical, forearm pain and anxiety.    Diagnoses " "and all orders for this visit:    Routine general medical examination at a health care facility  He has recovered from his Achilles tear after a nearly 1 year time course.  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  We did review at length options for management including pharmacologic versus counseling and at this time he would like to start with the mindfulness based stress reduction resources provided as well as the counseling referral.  He was appreciative of the information provided today and continues to reassess  -     Adult Mental Health  Referral; Future  -     TSH with free T4 reflex    Right tennis elbow  Suspected overuse injury of the right forearm.  He is already maxing out on majority of conservative approaches.  Option for naproxen down the road but we will start with physical therapy for now.  He is a previous heavy / with likely exasperation of symptoms from weightlifting  -     Physical Therapy Referral; Future    Other orders  -     INFLUENZA VACCINE IM > 6 MONTHS VALENT IIV4 (AFLURIA/FLUZONE)  -     COVID-19 12+ (2023-24) (PFIZER)        Patient has been advised of split billing requirements and indicates understanding: Yes      COUNSELING:   Reviewed preventive health counseling, as reflected in patient instructions      BMI:   Estimated body mass index is 31.62 kg/m  as calculated from the following:    Height as of this encounter: 1.761 m (5' 9.33\").    Weight as of this encounter: 98.1 kg (216 lb 3.2 oz).   Weight management plan: Discussed " healthy diet and exercise guidelines      He reports that he has never smoked. He has never used smokeless tobacco.          Porsha Monahan MD  [facility]

## 2023-11-22 NOTE — PATIENT INSTRUCTIONS
"MINDFULNESS    \"Mindfulness is about being fully awake in our lives. It is about perceiving the exquisite vividness of each moment.\"      - Pawan Chowdhury  Mindfulness-Based Stress Reduction (MBSR) is a blend of meditation, body awareness, and yoga:   learning through practice and study how your body handles (and can resolve) stress neurologically.     Mindfulness Resources (all are free):  \"Calm\" lauren- free trial has guided 10min sessions on anxiety, gratitude, sleep, happiness, managing stress, etc.   \"Smiling Minds\" lauren- short guided sessions for children and adults  \"Insight Timer\" lauren- free meditation timer with musical or bell tones, can also stream guided meditations    Free 8 week MBSR program online: https://"[employer]/      Some supplements have evidence to improve your mood.  Vitamin D 2000 IU once day  Vitamin B complex once daily (Vitamin B6 and 12)  Multivitamin once daily  Magnesium 350mg once daily  Fish oil 2000mg once daily  Methylfolate 1-15mg once daily  Zinc supplement  440mg/day or per instructions on the bottle    Essential oils like lavender, orange. Apply topically.    ---------------------------------------  Blue Zones, Living to 100!    The Blue Zones Solution by Blade Rodrigues  The Blue Zones Cookbook    In his book, Lilia identified the \"Power Nine\"- 9 concepts that are important in the lifestyles of blue zones people:     Move naturally- walk, bike, garden, etc  Life Purpose- \"why I wake up in the morning\"  Downshift- stress reduction through yoga, prayer, happy hour, meditation etc  80% rule- stop eating when stomach is 80% full  Plant Slant- plant based diet, especially beans.  Wine @ 5- Moderate alcohol intake, especially wine  Right Cocopah- social connections with people engaged in healthy behaviors   Community- engagement in spirituality or Zoroastrian  Loved Ones First- committment to family            "

## 2023-11-24 LAB
HBV SURFACE AB SERPL IA-ACNC: 0.19 M[IU]/ML
HBV SURFACE AB SERPL IA-ACNC: NONREACTIVE M[IU]/ML

## 2023-12-20 ENCOUNTER — THERAPY VISIT (OUTPATIENT)
Dept: PHYSICAL THERAPY | Facility: REHABILITATION | Age: 43
End: 2023-12-20
Attending: FAMILY MEDICINE
Payer: COMMERCIAL

## 2023-12-20 DIAGNOSIS — M77.11 RIGHT TENNIS ELBOW: ICD-10-CM

## 2023-12-20 PROCEDURE — 97110 THERAPEUTIC EXERCISES: CPT | Mod: GP | Performed by: PHYSICAL THERAPIST

## 2023-12-20 PROCEDURE — 97161 PT EVAL LOW COMPLEX 20 MIN: CPT | Mod: GP | Performed by: PHYSICAL THERAPIST

## 2023-12-20 NOTE — PROGRESS NOTES
PHYSICAL THERAPY EVALUATION  Type of Visit: Evaluation    See electronic medical record for Abuse and Falls Screening details.    Subjective       Presenting condition or subjective complaint: Right Elbow pain for about 1-1/2 years.  He is on the computer all day and does work out at the gym.  He tries many Zakadapter mice, sees chiropractor regular, ices regularly, performs graston techniques and has done a lot of self treatment over the time. He wears a copper sleeve daily at work and occasionally wears and elbow brace. The elbow brace does tend to irritate the area. He is right side dominant.  Date of onset: 01/01/23    Relevant medical history:     Dates & types of surgery:      Prior diagnostic imaging/testing results:       Prior therapy history for the same diagnosis, illness or injury: No      Prior Level of Function  Transfers:   Ambulation:   ADL:   IADL:     Living Environment  Social support: Alone   Type of home: Apartment/condo   Stairs to enter the home:         Ramp:     Stairs inside the home:         Help at home:    Equipment owned:       Employment:   on computer and mouse daily  Hobbies/Interests: lifting weights, volunteering with dog rescue    Patient goals for therapy: work on computer and lift without pain    Pain assessment: Pain present     Objective   PAIN: Pain Level at Rest: 0/10  Pain Level with Use: 4/10  INTEGUMENTARY (edema, incisions):   POSTURE: WNL  ROM:   (Degrees) Right AROM Right PROM Left AROM Left PROM   Elbow Flexion WNL      Elbow Extension WNL      Elbow Hyperextension       Wrist Flexion WNL      Wrist Extension WNL      Supination WNL, pain      Pronation WNL      Radial Deviation WNL      Ulnar Deviation WNL                                  Pain: not much currently but pain typically progresses as day progresses and he uses arm more  End Feel:     STRENGTH:   Pain: - none + mild ++ moderate +++ severe  Strength Scale: 0-5/5 Left Right   Elbow Flexion 5, ++ (mod)    Elbow  Extension 5    Wrist Flexion 5    Wrist Extension 5, + (mild)    Supination 5, ++ (mod)    Pronation 5    Ulnar Deviation 5    Radial Deviation 5     Strength (lbs) 115 124   Lateral Pinch (lbs) No pain    3 Point Pinch (lbs) No pain         Hand Dominance: Right  FLEXIBILITY:   SPECIAL TESTS:    Left Right   Valgus 0     Valgus 30     Moving Valgus Stress     Varus Stress     Elbow Flexion Test     Tinel's Cubital Tunnel     Tinel's Carpal Tunnel     Tinel's Guyon's Canal     Tinel's Radial Nerve Pin     Tinel's Radial Nerve SBRN Negative     Phalen's Negative     Resisted Long Finger Positive    Finkelstein Negative     CMC Grind Test     Froments Sign     ULTT: Medial Nerve Bias     ULTT: Ulnar Nerve Bias     ULTT: Radial Nerve Bias     Ulnar Nerve Compression     Axial Load of Thumb     Scaphoid Shift Test     Carpal Compression     TFCC Lift Test     TFCC Load Test            PALPATION:   + Tenderness At Location Right Left   Lateral Epicondyle +    Medial Epicondyle -    Olecranon Bursa -    Proximal Carpals -    Distal Carpals -    Pronator Teres +    Wrist Flexors -    Biceps -    Triceps -    Radial Head     Ulnar Collateral Ligament      TFCC     Intersection     ECRB Insertion +    ECRL Insertion     1st Dorsal Compartment     ECU Insertion     FCU Insertion     Ulnar Styloid     FCR Insertion     PIN     SBRN       JOINT MOBILITY:   CERVICAL SCREEN: WNL  THORACIC SCREEN: WNL  SHOULDER SCREEN: WNL    Assessment & Plan   CLINICAL IMPRESSIONS  Medical Diagnosis: Right Tennis Elbow    Treatment Diagnosis: Chronic Right Tennis Elbow   Impression/Assessment: Patient is a 43 year old male with elbow pain complaints.  The following significant findings have been identified: Pain, Impaired muscle performance, and Decreased activity tolerance. These impairments interfere with their ability to perform self care tasks, work tasks, and recreational activities as compared to previous level of function.      Clinical Decision Making (Complexity):  Clinical Presentation: Stable/Uncomplicated  Clinical Presentation Rationale: based on medical and personal factors listed in PT evaluation  Clinical Decision Making (Complexity): Low complexity    PLAN OF CARE  Treatment Interventions:  Modalities: Ultrasound  Interventions: Manual Therapy, Neuromuscular Re-education, Therapeutic Activity, Therapeutic Exercise, Self-Care/Home Management    Long Term Goals     PT Goal 1  Goal Identifier: pull ups at gym  Goal Description: Pt will be able to perform assisted pull-ups at gym with less reported pain of 0-1/10  Target Date: 03/19/24  PT Goal 2  Goal Identifier: lift  Goal Description: Pt will be able to lift dog kennel, >40-50#, with less reported pain of 0-1/10  Target Date: 03/19/24      Frequency of Treatment: 1-2x/week  Duration of Treatment: 90 days    Recommended Referrals to Other Professionals:   Education Assessment:   Learner/Method: Patient    Risks and benefits of evaluation/treatment have been explained.   Patient/Family/caregiver agrees with Plan of Care.     Evaluation Time:     PT Eval, Low Complexity Minutes (87491): 20       Signing Clinician: Kenia Masterson PT

## 2024-01-08 ENCOUNTER — THERAPY VISIT (OUTPATIENT)
Dept: PHYSICAL THERAPY | Facility: REHABILITATION | Age: 44
End: 2024-01-08
Payer: COMMERCIAL

## 2024-01-08 DIAGNOSIS — M77.11 RIGHT TENNIS ELBOW: Primary | ICD-10-CM

## 2024-01-08 PROCEDURE — 97035 APP MDLTY 1+ULTRASOUND EA 15: CPT | Mod: GP | Performed by: PHYSICAL THERAPIST

## 2024-01-08 PROCEDURE — 97110 THERAPEUTIC EXERCISES: CPT | Mod: GP | Performed by: PHYSICAL THERAPIST

## 2024-01-15 ENCOUNTER — THERAPY VISIT (OUTPATIENT)
Dept: PHYSICAL THERAPY | Facility: REHABILITATION | Age: 44
End: 2024-01-15
Payer: COMMERCIAL

## 2024-01-15 DIAGNOSIS — M77.11 RIGHT TENNIS ELBOW: Primary | ICD-10-CM

## 2024-01-15 PROCEDURE — 97035 APP MDLTY 1+ULTRASOUND EA 15: CPT | Mod: GP | Performed by: PHYSICAL THERAPIST

## 2024-01-15 PROCEDURE — 97110 THERAPEUTIC EXERCISES: CPT | Mod: GP | Performed by: PHYSICAL THERAPIST

## 2024-01-16 ENCOUNTER — TELEPHONE (OUTPATIENT)
Dept: FAMILY MEDICINE | Facility: CLINIC | Age: 44
End: 2024-01-16
Payer: COMMERCIAL

## 2024-01-16 DIAGNOSIS — M77.11 RIGHT TENNIS ELBOW: Primary | ICD-10-CM

## 2024-01-16 DIAGNOSIS — M25.531 FOREARM JOINT PAIN, RIGHT: ICD-10-CM

## 2024-01-16 NOTE — TELEPHONE ENCOUNTER
----- Message from Kenia Masterson, PT sent at 1/15/2024  2:38 PM CST -----  Regarding: OT referral/certified hand therapist  Dr. Monahan      I am seeing Rocael in PT but think he will benefit from a couple visits from the certified hand therapist, to be seen in [place].  Could you please put in a referral for this?      Thanks.  Please let me know if you have questions.    Kenia

## 2024-01-22 ENCOUNTER — THERAPY VISIT (OUTPATIENT)
Dept: OCCUPATIONAL THERAPY | Facility: REHABILITATION | Age: 44
End: 2024-01-22
Payer: COMMERCIAL

## 2024-01-22 DIAGNOSIS — M25.531 FOREARM JOINT PAIN, RIGHT: ICD-10-CM

## 2024-01-22 DIAGNOSIS — M77.11 RIGHT TENNIS ELBOW: ICD-10-CM

## 2024-01-22 PROCEDURE — 97110 THERAPEUTIC EXERCISES: CPT | Mod: GO | Performed by: OCCUPATIONAL THERAPIST

## 2024-01-22 PROCEDURE — 97165 OT EVAL LOW COMPLEX 30 MIN: CPT | Mod: GO | Performed by: OCCUPATIONAL THERAPIST

## 2024-01-22 PROCEDURE — 97530 THERAPEUTIC ACTIVITIES: CPT | Mod: GO | Performed by: OCCUPATIONAL THERAPIST

## 2024-01-22 NOTE — PROGRESS NOTES
OCCUPATIONAL THERAPY EVALUATION  Type of Visit: Evaluation    See electronic medical record for Abuse and Falls Screening details.    Subjective      Presenting condition or subjective complaint: Right tennis elbow  Date of onset: 01/16/24 (Referral)    Relevant medical history: -- (See chart. History of right shoulder labral tear, laceration to the right forearm.)   Dates & types of surgery: See chart.    Prior diagnostic imaging/testing results:       Prior therapy history for the same diagnosis, illness or injury: Yes (PT) Current    Presenting condition or subjective complaint: Right Elbow pain for about 1-1/2 years.  He is on the computer all day and does work out at the gym.  He has attempted a fair amount of independent management including iASTM, cupping, massage, acupuncture, exercise, orthotic wear, strengthening, PT, adaptive equipment for keyboarding, rest, etc. He wears a copper sleeve daily at work and occasionally wears a counter force brace at daytime (doesn't like) and wrist cock-ups at night. Is currently undergoing a course of physical therapy with some relief.     Prior Level of Function  Transfers: Independent  Ambulation: Independent  ADL: Independent  IADL: Driving, Finances, Housekeeping, Laundry, Meal preparation, Medication management, Work, Yard work    Living Environment  Social support: Alone   Type of home: Apartment/condo   Stairs to enter the home: Yes 0 (1 flight) Is there a railing: Yes   Ramp: No   Stairs inside the home: No       Help at home: None  Equipment owned:       Employment: Yes   Hobbies/Interests: Weightlifting, cardio, walking dogs at rescue    Patient goals for therapy: Work at the computer, go to the gym without pain       Objective   ADDITIONAL HISTORY:  Right hand dominant  Patient reports symptoms of pain, stiffness/loss of motion, weakness/loss of strength, and edema  Transportation: drives  Currently working in normal job without  restrictions    Functional Outcome Measure:   Upper Extremity Functional Index Score:  SCORE:   Column Totals: /80: 72   (A lower score indicates greater disability.)    PAIN:  Pain Level at Rest: 0/10  Pain Level with Use: 7/10  Pain Location: Dorsal forearm, in proximity of radial tunnel, LEP   Pain Quality: Aching and Sharp  Pain Frequency: intermittent  Pain is Worst: daytime  Pain is Exacerbated By: Typing, weightlifting, dog walking   Pain is Relieved By: IASTM, ice, cupping, stretch, chiropractics, acupuncture  Pain Progression: Improved    POSTURE: Normal     EDEMA:  Mild to palpation about the right LEP, MEP, distal bicep tendon       SENSATION: Decreased Ulnar Nerve distribution per pt report. Intermittent, primarily at nighttime.     SPECIAL TESTS:   Ulnar Nerve Special Tests  Pain Report Left Right   Tinel's Cubital Tunnel Negative Negative   Tinel's Guyon's Canal Negative Negative   Elbow Flexion Test Negative Negative   Ulnar N Subluxation at Elbow Negative Negative   Froment's Test Negative Negative       NEURAL TENSION TESTING: UNT: Ulnar Neurodynamic Test (based on WILSON Alexander's ULNT)   1/22/2024   0-5 Scale 3+/5   Position:   0/5: Arm across abdomen in coronal plane  1/5: ER to neutral ABD shoulder to 45 degrees  2/5: ER shoulder to 90 degrees  3/5: Block shoulder and ABD shoulder to 110 degrees  4/5: Fully pronate forearm, extend wrist and ring and small fingers  5/5: Flex elbow and bring hand to side of face  Notes:  (+) indicates beyond grade level but less than alf to next level  (-) indicates over alf to level  S1 onset/change of patient's symptoms  S2 definite stop point based on patient's discomfort level    RESISTED TESTING: Resisted Testing (pain report)   Right   Elbow Extension Negative   Elbow Flexion +   Supination  +   Pronation Trace    Wrist Ext with RD, Elbow at side Trace   Wrist Ext with UD, Elbow at side Trace   Wrist Ext with RD, Elbow Ext ++   Wrist Ext with UD, Elbow  Ext +   Wrist Flex with RD, Elbow at side Negative   Wrist Flex with UD, Elbow at side Negative   Wrist Flex with RD, Elbow Ext Negative   Wrist Flex with UD, Elbow Ext Negative   EDC with Elbow at side Trace   EDC with Elbow Ext Trace    Long Finger Test Trace      STRENGTH:     Measured in pounds 1/22/2024 1/22/2024    Left Right   Trial 1 103# 96#   Trial 2 122# 88#,  ++   Trial 3     Average       PALPATION:  Mild to palpation about the right LEP, distal bicep tendon, common extensor tendon, extensor bellies.     Assessment & Plan   CLINICAL IMPRESSIONS  Medical Diagnosis: Right tennis elbow, forearm joint pain, right    Treatment Diagnosis: Right tennis elbow, forearm joint pain, right    Impression/Assessment: Pt is a 43 year old male presenting to Occupational Therapy due to right lateral tennis elbow.  The following significant findings have been identified: Impaired activity tolerance, Impaired coordination, Impaired ROM, Impaired sensation, Impaired strength, and Pain.  These identified deficits interfere with their ability to perform self care tasks, work tasks, recreational activities, household chores, driving , financial management,  yard work, care of others, and meal planning and preparation as compared to previous level of function.   Patient's limitations or Problem List includes: Pain, Decreased ROM/motion, Increased edema, Weakness, Hypermobility, Hypomobility, Decreased , Decreased pinch, Decreased coordination, Decreased dexterity, and Tightness in musculature of the right shoulder, elbow, wrist, hand, thumb, index finger, long finger, ring finger, and small finger which interferes with the patient's ability to perform Self Care Tasks (dressing), Work Tasks, Sleep Patterns, Recreational Activities, Household Chores, and Driving  as compared to previous level of function.    Clinical Decision Making (Complexity):  Assessment of Occupational Performance: 1-3 Performance  Deficits  Occupational Performance Limitations: dressing, hygiene and grooming, care of others, care of pets, communication management, driving and community mobility, health management and maintenance, home establishment and management, meal preparation and cleanup, shopping, sleep, work, leisure activities, and social participation  Clinical Decision Making (Complexity): Low complexity    PLAN OF CARE  Treatment Interventions:  Modalities:  US  Therapeutic Exercise:  AROM, AAROM, PROM, Tendon Gliding, Blocking, Reverse Blocking, Place and Hold, Contract Relax, Extensor Tracking, Isotonics, Isometrics, and Stabilization  Neuromuscular re-education:  Nerve Gliding, Coordination/Dexterity, Sensory re-education, Desensitization, Kinesthetic Training, Proprioceptive Training, Posture, Kinesiotaping, Strain Counter Strain, Isometrics, and Stabilization  Manual Techniques:  Coordination/Dexterity, Joint mobilization, Friction massage, Myofascial release, and Manual edema mobilization  Orthotic Fabrication:  Static, Forearm based, and Long arm  Self Care:  Self Care Tasks, Ergonomic Considerations, and Work Tasks    Long Term Goals   OT Goal 1  Goal Identifier: Goal I  Goal Description: Patient will complete routine work duties with little to no pain (0-1/10) using AE and activity modification recommendations as able.  Rationale: In order to maximize safety and independence with performance of self-care activities;In order to maximize safety and independence with ADL/IADLs  Goal Progress: New  Target Date: 03/18/24      Frequency of Treatment: 1x/week  Duration of Treatment: 8 weeks     Education Assessment: Learner/Method: Patient;No Barriers to Learning;Pictures/Video;Demonstration;Reading     Risks and benefits of evaluation/treatment have been explained.   Patient/Family/caregiver agrees with Plan of Care.     Evaluation Time:    OT Eval, Low Complexity Minutes (28102): 18    Signing Clinician: Rocky Rivero  OT

## 2024-01-29 ENCOUNTER — THERAPY VISIT (OUTPATIENT)
Dept: OCCUPATIONAL THERAPY | Facility: REHABILITATION | Age: 44
End: 2024-01-29
Payer: COMMERCIAL

## 2024-01-29 DIAGNOSIS — M25.531 FOREARM JOINT PAIN, RIGHT: ICD-10-CM

## 2024-01-29 DIAGNOSIS — M77.11 RIGHT TENNIS ELBOW: Primary | ICD-10-CM

## 2024-01-29 PROCEDURE — 97140 MANUAL THERAPY 1/> REGIONS: CPT | Mod: GO | Performed by: OCCUPATIONAL THERAPIST

## 2024-01-29 PROCEDURE — 97110 THERAPEUTIC EXERCISES: CPT | Mod: GO | Performed by: OCCUPATIONAL THERAPIST

## 2024-02-05 ENCOUNTER — THERAPY VISIT (OUTPATIENT)
Dept: OCCUPATIONAL THERAPY | Facility: REHABILITATION | Age: 44
End: 2024-02-05
Payer: COMMERCIAL

## 2024-02-05 DIAGNOSIS — M25.531 FOREARM JOINT PAIN, RIGHT: ICD-10-CM

## 2024-02-05 DIAGNOSIS — M77.11 RIGHT TENNIS ELBOW: Primary | ICD-10-CM

## 2024-02-05 PROCEDURE — 97140 MANUAL THERAPY 1/> REGIONS: CPT | Mod: GO | Performed by: OCCUPATIONAL THERAPIST

## 2024-02-05 PROCEDURE — 97110 THERAPEUTIC EXERCISES: CPT | Mod: GO | Performed by: OCCUPATIONAL THERAPIST

## 2024-02-23 NOTE — PROGRESS NOTES
DISCHARGE  Reason for Discharge: Pt was referred to hand therapy to continue care.     Equipment Issued:     Discharge Plan: Other services:  .    Referring Provider:  Porsha Monahan         01/15/24 0500   Appointment Info   Signing clinician's name / credentials Kenia Masterson, PT   Visits Used 3   Medical Diagnosis Right Tennis Elbow   PT Tx Diagnosis Chronic Right Tennis Elbow   Progress Note/Certification   Onset of illness/injury or Date of Surgery 01/01/23   Therapy Frequency 1-2x/week   Predicted Duration 90 days   PT Goal 1   Goal Identifier pull ups at gym   Goal Description Pt will be able to perform assisted pull-ups at gym with less reported pain of 0-1/10   Target Date 03/19/24   PT Goal 2   Goal Identifier lift   Goal Description Pt will be able to lift dog kennel, >40-50#, with less reported pain of 0-1/10   Target Date 03/19/24   Subjective Report   Subjective Report Unsure if the ultrasound was helpful.  I didn't have the nagging pain throughout the week but unsure if the ultrasound was helpful.  It was a light week at work too.  I did try the ice massage and I really like that.   Objective Measure 1   Objective Measure UEFI: 76   Ultrasound   Ultrasound Minutes (70662) 10   Ultrasound -Type (does not include 3-5 min prep/cleanup time) Pulsed 100%;4 cm sound head   Intensity 1.0w/cm2   Duration (does not include the 3-5 min set up/clean up time) 8 min   Frequency 3 MHz   Location R lateral epicondyle and upper 1/3 wrist extensors   Positioning sitting   Medication gel only   Therapeutic Procedure/Exercise   Therapeutic Procedures: strength, endurance, ROM, flexibillity minutes (75645) 30   Ther Proc 1 - Details edu and questions on scapular region, previous shoulder issues, treatment to scapular area performed in the past   Ther Proc 2 wrist extension isometric hold 45 seconds X 1-2 reps   Ther Proc 2 - Details median tension stretch, hold 30 seconds   Ther Proc 3 wrist extensor  stretch with fist, hold 30 seconds x 1-3 reps   Ther Proc 3 - Details radial nerve glide   Skilled Intervention all on R side,   Education   Learner/Method Patient   Plan   Plan for next session referral to CHT, MFR with PT   Comments   Comments Assessment: Pt returns and isn't sure if the ultrasound helped or not as he had a light week at work.  Pt will get pain and at times relief if he lifts with shoulder exercises, no specific exercises causes one or the other.  He has had PRP to his scapular area in the past which alleviated his pain for 2 years prior to it returning.  Question if he has multiple areas involved so may have him see the hand therapist in addition to trying MFR at scapular region.  He was in agreement with this plan.  Will ask PCP for referral to OT to work on his wrist/elbow.  Will discharge ultrasound and return treatment with it if other treatments do not work.   Total Session Time   Timed Code Treatment Minutes 40   Total Treatment Time (sum of timed and untimed services) 40

## 2024-02-26 ENCOUNTER — THERAPY VISIT (OUTPATIENT)
Dept: OCCUPATIONAL THERAPY | Facility: REHABILITATION | Age: 44
End: 2024-02-26
Payer: COMMERCIAL

## 2024-02-26 DIAGNOSIS — M25.531 FOREARM JOINT PAIN, RIGHT: ICD-10-CM

## 2024-02-26 DIAGNOSIS — M77.11 RIGHT TENNIS ELBOW: Primary | ICD-10-CM

## 2024-02-26 PROCEDURE — 97110 THERAPEUTIC EXERCISES: CPT | Mod: GO | Performed by: OCCUPATIONAL THERAPIST

## 2024-02-26 NOTE — PROGRESS NOTES
02/26/24 0500   Appointment Info   Treating Provider Rocky Rivero, JAREK, OTR/L, CLT   Total/Authorized Visits 8 (POC)   Visits Used 4   Medical Diagnosis Right tennis elbow, forearm joint pain, right   OT Tx Diagnosis Right tennis elbow, forearm joint pain, right   Progress Note/Certification   Onset of Illness/Injury or Date of Surgery 01/16/24  (Referral)   Therapy Frequency 1x/week   Predicted Duration 8 weeks   Progress Note Due Date 03/18/24   Progress Note Completed Date 01/22/24   Goals   OT Goals 1   OT Goal 1   Goal Identifier Goal I   Goal Description Patient will complete routine work duties with little to no pain (0-1/10) using AE and activity modification recommendations as able.   Rationale In order to maximize safety and independence with performance of self-care activities;In order to maximize safety and independence with ADL/IADLs   Goal Progress Achieved   Target Date 03/18/24   Subjective Report   Subjective Report The stretches seem to really help my elbow! I think I can manage it now on my own.   Objective Measure 1   Details Pain at rest   Objective Measure 0/10   Objective Measure 2   Objective Measure 2-3/10   Details Pain with activity   Objective Measure 3   Objective Measure 94#   Details RUE  strength, elbow extended, FA pronated   Treatment Interventions (OT)   Interventions Therapeutic Procedure/Exercise;Therapeutic Activity;Manual Therapy   Therapeutic Procedure/Exercise   Therapeutic Procedure: strength, endurance, ROM, flexibillity minutes (47579) 26   Ther Proc 1 Objective measurements to assess growth towards goals.   PTRx Ther Proc 1 Healthy Computer Use   PTRx Ther Proc 1 - Details HEP   PTRx Ther Proc 2 Friction Massage   PTRx Ther Proc 2 - Details HEP   PTRx Ther Proc 3 Forearm Passive Range of Motion Extensor Stretch   PTRx Ther Proc 3 - Details HEP   PTRx Ther Proc 4 Forearm Passive Range of Motion Advanced Extensor Stretch   PTRx Ther Proc 4 - Details HEP   PTRx Ther  Proc 5 Wrist Strengthening Isometric Extension   PTRx Ther Proc 5 - Details HEP   PTRx Ther Proc 6 Wrist Strengthening Eccentric Extension with Dumb Bey   PTRx Ther Proc 6 - Details HEP   PTRx Ther Proc 7 Supine Ulnar Nerve Mobility   PTRx Ther Proc 7 - Details HEP   Skilled Intervention For improvement soft tissue extensibility and reduced irritation, minimizing pain with aDL, iADL, work and leisure.   Patient Response/Progress Tolerated well, patient demonstrated and verbalized understanding.   PTRx Ther Proc 8 Shoulder Theraband Internal Rotation   PTRx Ther Proc 8 - Details HEP, new. 2 x 10 in-clinic with education, assessment of tolerance and provision of resistance prescription. 2x/week.   PTRx Ther Proc 9 Shoulder Theraband External Rotation   PTRx Ther Proc 9 - Details HEP, new. 2 x 10 in-clinic with education, assessment of tolerance and provision of resistance prescription. 2x/week.   PTRx Ther Proc 10 Shoulder Theraband Low Row/Pulldown   PTRx Ther Proc 10 - Details HEP, new. 2 x 10 in-clinic with education, assessment of tolerance and provision of resistance prescription. 2x/week.   PTRx Ther Proc 11 Shoulder Front Raises with Dumbbells   PTRx Ther Proc 11 - Details HEP, new. 2 x 10 in-clinic with education, assessment of tolerance and provision of resistance prescription. Modified to eccentric, reduced weight. 2x/week.   Ther Proc 2 Review of HEP with education regarding continuation and taper of forearm stretching and strengthening, inclusion of and progression of scapular strengthening, modifications to deltoid strengthening.   Ther Proc 3 Expanded education regarding identification of signs and symptoms of returned lateral epicondylitis, facilitate independence with resumption of orthotic wear/exercise and activity modification as needed.   Therapeutic Procedures Ther Proc 2;Ther Proc 3   Therapeutic Activity   PTRx Ther Act 1 Cubital Tunnel Prevention   PTRx Ther Act 1 - Details HEP   PTRx Ther Act  2 TENNIS ELBOW PREVENTION   PTRx Ther Act 2 - Details HEP   PTRx Ther Act 3 Computer Ergonomics   PTRx Ther Act 3 - Details HEP   PTRx Ther Act 4 Education Sheet General   PTRx Ther Act 4 - Details HEP   PTRx Ther Act 5 Sitting Posture at Computer   PTRx Ther Act 5 - Details HEP   PTRx Ther Act 6 Orthosis Wear and Care   PTRx Ther Act 6 - Details HEP, PRN   Skilled Intervention For reduced soft tissue and nerve irritation, reducing pain and improving activity tolerance with ADL, iADL, work and leisure.   Patient Response/Progress Tolerated well, patient demonstrated and verbalized understanding.   Education   Learner/Method Patient;No Barriers to Learning;Pictures/Video;Demonstration;Reading   Plan   Home program Per PTRX, implement protective strategies for the ulnar nerve, per lateral epicondylitis where able.   Updates to plan of care DC         DISCHARGE  Reason for Discharge: Patient has met all goals.    Discharge Plan: Patient to continue home program.    Referring Provider:  Porsha Monahan

## 2024-07-03 ENCOUNTER — APPOINTMENT (OUTPATIENT)
Dept: URBAN - METROPOLITAN AREA CLINIC 260 | Age: 44
Setting detail: DERMATOLOGY
End: 2024-07-03

## 2024-07-03 VITALS — WEIGHT: 220 LBS | RESPIRATION RATE: 14 BRPM | HEIGHT: 69 IN

## 2024-07-03 DIAGNOSIS — L82.1 OTHER SEBORRHEIC KERATOSIS: ICD-10-CM

## 2024-07-03 DIAGNOSIS — L44.8 OTHER SPECIFIED PAPULOSQUAMOUS DISORDERS: ICD-10-CM

## 2024-07-03 DIAGNOSIS — Z71.89 OTHER SPECIFIED COUNSELING: ICD-10-CM

## 2024-07-03 DIAGNOSIS — D22 MELANOCYTIC NEVI: ICD-10-CM

## 2024-07-03 DIAGNOSIS — D18.0 HEMANGIOMA: ICD-10-CM

## 2024-07-03 DIAGNOSIS — L81.4 OTHER MELANIN HYPERPIGMENTATION: ICD-10-CM

## 2024-07-03 PROBLEM — D22.5 MELANOCYTIC NEVI OF TRUNK: Status: ACTIVE | Noted: 2024-07-03

## 2024-07-03 PROBLEM — D18.01 HEMANGIOMA OF SKIN AND SUBCUTANEOUS TISSUE: Status: ACTIVE | Noted: 2024-07-03

## 2024-07-03 PROCEDURE — 99213 OFFICE O/P EST LOW 20 MIN: CPT

## 2024-07-03 PROCEDURE — OTHER MIPS QUALITY: OTHER

## 2024-07-03 PROCEDURE — OTHER COUNSELING: OTHER

## 2024-07-03 ASSESSMENT — LOCATION ZONE DERM
LOCATION ZONE: LEG
LOCATION ZONE: SCALP
LOCATION ZONE: TRUNK
LOCATION ZONE: FACE

## 2024-07-03 ASSESSMENT — LOCATION SIMPLE DESCRIPTION DERM
LOCATION SIMPLE: RIGHT FOREHEAD
LOCATION SIMPLE: RIGHT POSTERIOR THIGH
LOCATION SIMPLE: SCALP
LOCATION SIMPLE: UPPER BACK
LOCATION SIMPLE: ABDOMEN
LOCATION SIMPLE: CHEST

## 2024-07-03 ASSESSMENT — LOCATION DETAILED DESCRIPTION DERM
LOCATION DETAILED: RIGHT INFERIOR LATERAL FOREHEAD
LOCATION DETAILED: RIGHT MEDIAL SUPERIOR CHEST
LOCATION DETAILED: RIGHT DISTAL POSTERIOR THIGH
LOCATION DETAILED: EPIGASTRIC SKIN
LOCATION DETAILED: LEFT SUPERIOR PARIETAL SCALP
LOCATION DETAILED: INFERIOR THORACIC SPINE
LOCATION DETAILED: RIGHT RIB CAGE

## 2024-07-03 NOTE — HPI: FULL BODY SKIN EXAMINATION
What Is The Reason For Today's Visit?: Full Body Skin Examination
What Is The Reason For Today's Visit? (Being Monitored For X): concerning skin lesions on an annual basis
Additional History: He expresses concern about a lesion on his right temple that he believes has been there for a long time.

## 2024-11-27 ENCOUNTER — OFFICE VISIT (OUTPATIENT)
Dept: FAMILY MEDICINE | Facility: CLINIC | Age: 44
End: 2024-11-27
Payer: COMMERCIAL

## 2024-11-27 VITALS
RESPIRATION RATE: 16 BRPM | DIASTOLIC BLOOD PRESSURE: 89 MMHG | BODY MASS INDEX: 34.3 KG/M2 | HEIGHT: 69 IN | SYSTOLIC BLOOD PRESSURE: 160 MMHG | TEMPERATURE: 98.4 F | OXYGEN SATURATION: 98 % | WEIGHT: 231.6 LBS | HEART RATE: 70 BPM

## 2024-11-27 DIAGNOSIS — Z86.718 PERSONAL HISTORY OF DVT (DEEP VEIN THROMBOSIS): ICD-10-CM

## 2024-11-27 DIAGNOSIS — Z12.11 SCREEN FOR COLON CANCER: ICD-10-CM

## 2024-11-27 DIAGNOSIS — R03.0 ELEVATED BP WITHOUT DIAGNOSIS OF HYPERTENSION: ICD-10-CM

## 2024-11-27 DIAGNOSIS — Z00.00 ROUTINE GENERAL MEDICAL EXAMINATION AT A HEALTH CARE FACILITY: Primary | ICD-10-CM

## 2024-11-27 LAB
ANION GAP SERPL CALCULATED.3IONS-SCNC: 8 MMOL/L (ref 7–15)
BUN SERPL-MCNC: 19.3 MG/DL (ref 6–20)
CALCIUM SERPL-MCNC: 8.9 MG/DL (ref 8.8–10.4)
CHLORIDE SERPL-SCNC: 102 MMOL/L (ref 98–107)
CHOLEST SERPL-MCNC: 232 MG/DL
CREAT SERPL-MCNC: 1.05 MG/DL (ref 0.67–1.17)
EGFRCR SERPLBLD CKD-EPI 2021: 89 ML/MIN/1.73M2
EST. AVERAGE GLUCOSE BLD GHB EST-MCNC: 108 MG/DL
FASTING STATUS PATIENT QL REPORTED: YES
FASTING STATUS PATIENT QL REPORTED: YES
GLUCOSE SERPL-MCNC: 102 MG/DL (ref 70–99)
HBA1C MFR BLD: 5.4 % (ref 0–5.6)
HCO3 SERPL-SCNC: 25 MMOL/L (ref 22–29)
HDLC SERPL-MCNC: 46 MG/DL
HGB BLD-MCNC: 16.3 G/DL (ref 13.3–17.7)
LDLC SERPL CALC-MCNC: 144 MG/DL
NONHDLC SERPL-MCNC: 186 MG/DL
POTASSIUM SERPL-SCNC: 4.4 MMOL/L (ref 3.4–5.3)
SODIUM SERPL-SCNC: 135 MMOL/L (ref 135–145)
TRIGL SERPL-MCNC: 208 MG/DL
TSH SERPL DL<=0.005 MIU/L-ACNC: 3.1 UIU/ML (ref 0.3–4.2)

## 2024-11-27 SDOH — HEALTH STABILITY: PHYSICAL HEALTH: ON AVERAGE, HOW MANY DAYS PER WEEK DO YOU ENGAGE IN MODERATE TO STRENUOUS EXERCISE (LIKE A BRISK WALK)?: 7 DAYS

## 2024-11-27 SDOH — HEALTH STABILITY: PHYSICAL HEALTH: ON AVERAGE, HOW MANY MINUTES DO YOU ENGAGE IN EXERCISE AT THIS LEVEL?: 60 MIN

## 2024-11-27 ASSESSMENT — SOCIAL DETERMINANTS OF HEALTH (SDOH): HOW OFTEN DO YOU GET TOGETHER WITH FRIENDS OR RELATIVES?: ONCE A WEEK

## 2024-11-27 NOTE — PROGRESS NOTES
"Preventive Care Visit  [facility]  Porsha Monahan MD, Family Medicine  Nov 27, 2024      Assessment & Plan     Routine general medical examination at a health care facility  Overall doing well. Plans to drop weight over the next 6 months as his body weight competition goals have changed and he plans to move more into endurance training and Sprint triathlons  - Colonoscopy Screening  Referral; Future  - Hemoglobin A1c; Future  - Hemoglobin; Future  - Lipid panel reflex to direct LDL Non-fasting; Future  - Basic metabolic panel  (Ca, Cl, CO2, Creat, Gluc, K, Na, BUN); Future  - TSH with free T4 reflex; Future    Screen for colon cancer  - Colonoscopy Screening  Referral; Future    Elevated BP w/o dx HTN  Outside BP has been <140/90; today 155/90s x 2 essentially; asymptomatic. He is going to check numbers at home and bring us an update. Historically not an issue however he is at heavier weight than usual for him and he plans to work on diet/exercise yet      Personal history of DVT (deep vein thrombosis)   10/17/2022 he was seen at the urgent care for deep vein thrombosis in his right leg due to a recent Achilles tear. Completed 3 month course of Eliquis       Patient has been advised of split billing requirements and indicates understanding: Yes        BMI  Estimated body mass index is 33.88 kg/m  as calculated from the following:    Height as of this encounter: 1.761 m (5' 9.33\").    Weight as of this encounter: 105.1 kg (231 lb 9.6 oz).   Weight management plan: Discussed healthy diet and exercise guidelines    Counseling  Appropriate preventive services were addressed with this patient via screening, questionnaire, or discussion as appropriate for fall prevention, nutrition, physical activity, Tobacco-use cessation, social engagement, weight loss and cognition.  Checklist reviewing preventive services available has been given to the patient.  Reviewed patient's " diet, addressing concerns and/or questions.   He is at risk for psychosocial distress and has been provided with information to reduce risk.       Follow-up 1 year    Subjective   Rocael is a 44 year old, presenting for the following:  Physical        11/27/2024     7:01 AM   Additional Questions   Roomed by Allyn TRAMMELL MA          HPI    Had PRK eye surgery ; painful but did ok.   The steroid drops caused his tooth to act up and needed a root canal replacement    His gym shut down- joined [employer] now instead    Started to prep for a 5K this summer but then had the eye surgery and couldn't keep that up with wearing glasses so changed to walking over the summer  Running 1-1.5mi/day now this fall    Target 200lbs over the next 6 months  Would like to do a 10k and Sprint triathlon in the future year as well    Has some stomach issues he thinks is related to food- we briefly reviewed low FODMAP approach and options for going forward to follow up with me    BP at dentist was 135 systolic (on arrival here it was 155/96)  Wt Readings from Last 3 Encounters:   11/27/24 105.1 kg (231 lb 9.6 oz)   11/22/23 98.1 kg (216 lb 3.2 oz)   09/18/23 104 kg (229 lb 4.5 oz)       Social History     Social History Narrative     for a [employer]. 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  Exercises 1-2hr/day. Competitive body building.   No alcohol or smoking.  Currently single.  He has cats.   Never smoker. No alcohol  Porsha Monahan MD               Health Care Directive  Patient does not have a Health Care Directive: Discussed advance care planning with patient; information given to patient to review.      11/27/2024   General Health   How would you rate your overall physical health? (!) FAIR   Feel stress (tense, anxious, or unable to sleep) To some extent      (!) STRESS CONCERN      11/27/2024   Nutrition   Three or more servings of calcium each day? Yes   Diet: Regular (no  restrictions)   How many servings of fruit and vegetables per day? (!) 2-3   How many sweetened beverages each day? 0-1            11/27/2024   Exercise   Days per week of moderate/strenous exercise 7 days   Average minutes spent exercising at this level 60 min            11/27/2024   Social Factors   Frequency of gathering with friends or relatives Once a week   Worry food won't last until get money to buy more No   Food not last or not have enough money for food? No   Do you have housing? (Housing is defined as stable permanent housing and does not include staying ouside in a car, in a tent, in an abandoned building, in an overnight shelter, or couch-surfing.) Yes   Are you worried about losing your housing? No   Lack of transportation? No   Unable to get utilities (heat,electricity)? No            11/27/2024   Dental   Dentist two times every year? Yes            11/27/2024   TB Screening   Were you born outside of the [place]? No            Today's PHQ-2 Score:       11/27/2024     6:54 AM   PHQ-2 ( 1999 [employer])   Q1: Little interest or pleasure in doing things 0    Q2: Feeling down, depressed or hopeless 0    PHQ-2 Score 0    Q1: Little interest or pleasure in doing things Not at all   Q2: Feeling down, depressed or hopeless Not at all   PHQ-2 Score 0       Patient-reported           11/27/2024   Substance Use   Alcohol more than 3/day or more than 7/wk No   Do you use any other substances recreationally? No        Social History     Tobacco Use    Smoking status: Never    Smokeless tobacco: Never   Vaping Use    Vaping status: Never Used   Substance Use Topics    Alcohol use: Yes     Comment: Alcoholic Drinks/day: 0-1/week    Drug use: No           11/27/2024   STI Screening   New sexual partner(s) since last STI/HIV test? No      ASCVD Risk   The 10-year ASCVD risk score (Dyana RICKS, et al., 2019) is: 3.1%    Values used to calculate the score:      Age: 44 years      Sex: Male      Is Non-   "American: No      Diabetic: No      Tobacco smoker: No      Systolic Blood Pressure: 155 mmHg      Is BP treated: No      HDL Cholesterol: 58 mg/dL      Total Cholesterol: 236 mg/dL        2024   Contraception/Family Planning   Questions about contraception or family planning No           Reviewed and updated as needed this visit by Provider   Tobacco  Allergies  Meds  Problems  Med Hx  Surg Hx  Fam Hx                     Objective    Exam  BP (!) 155/96 (BP Location: Left arm, Patient Position: Sitting, Cuff Size: Adult Regular)   Pulse 70   Temp 98.4  F (36.9  C) (Temporal)   Resp 16   Ht 1.761 m (5' 9.33\")   Wt 105.1 kg (231 lb 9.6 oz)   SpO2 98%   BMI 33.88 kg/m     Estimated body mass index is 33.88 kg/m  as calculated from the following:    Height as of this encounter: 1.761 m (5' 9.33\").    Weight as of this encounter: 105.1 kg (231 lb 9.6 oz).    Physical Exam  GENERAL: alert and no distress  EYES: Eyes grossly normal to inspection, PERRL and conjunctivae and sclerae normal  HENT: ear canals and TM's normal, nose and mouth without ulcers or lesions  NECK: no adenopathy, no asymmetry, masses, or scars  RESP: lungs clear to auscultation - no rales, rhonchi or wheezes  CV: regular rate and rhythm, normal S1 S2, no S3 or S4, no murmur, click or rub, no peripheral edema  ABDOMEN: soft, nontender, no hepatosplenomegaly, no masses and bowel sounds normal   (male): normal male genitalia without lesions or urethral discharge, no hernia; no testicular nodules (pt requests testicular exam due to friend  young with this condition)  MS: no gross musculoskeletal defects noted, no edema  SKIN: no suspicious lesions or rashes  NEURO: Normal strength and tone, mentation intact and speech normal  PSYCH: mentation appears normal, affect normal/bright        Signed Electronically by: Porsha Monahan MD    "

## 2024-11-27 NOTE — PATIENT INSTRUCTIONS
Supplements to consider:     IBgard  is a product that contains peppermint oil and is used to treat upset stomach, cramps, and irritable bowel syndrome.     FDgard  is a product that contains l-Menthol and Wesco oil to help manage bloating, upset stomach, nausea and difficulty finishing a meal.    -------------------------  Consider looking into a low FODMAP diet approach to identify triggers for your symptoms and eliminate these from your diet in a structured, specific way.     [email or web address] [email or web address]    Information from our dietician Rosetta Uribe RD  nicely summarizes the two approaches we talked about today:    For full low FODMAP diet:     - Start with eliminating higher FODMAP foods for 3-4 weeks.      -Then after 3-4 weeks start adding those higher FODMAP foods back into diet. (see process for reintroducing below).     OR   If you prefer to do a partial lower FODMAP diet, then     -For Modified/partial lower FODMAP diet, review the list of high FODMAP foods and then eliminate several of those higher FODMAP foods that you eat most days of the week for 3-4 weeks.     After 3-4 weeks, to add higher FODMAP foods back into diet:     -Start by adding back in 1 higher fodmap food at a time by eating a small serving of that food each day for 3 days in a row OR you can gradually increase the portion size over the course of the 3 days. For example on day 1, can have 1/4 cup serving, then on day 2, can have 1/3 c and then on day 3, can have a 1/2 cup serving.     -If tolerated, then wait at least one day, and then add another higher FODMAP food back into diet for 3 days in a row and onward.      -If you prefer to reintroduce foods slowly, then you could add one food each week (depending on how quickly you'd like to re-introduce and also depending on if you have a return of symptoms that may linger for a day or so).     -If you notice any symptoms after adding back in a higher FODMAP food than can wait until  symptoms improve before trying the next higher FODMAP food.      -Also if you notice a significant increase in symptoms after retrying the first day, you do not need to eat on additional days.     Plan menus and meals ahead of time to help you stick to the elimination diet.     Here are some websites with low FODMAP recipes:     Www.fodmapeveryday.Mediastream  Www.Fivejack.Mediastream  IBSfree.net     [employer] at www.Filmijob sells ready made low FODMAP meals. Here is a coupon code for 60% off some meals. Code = 60off     [facility] is the [facility] that created and did the initial research for the low FODMAP diet. They have more information, an lauren and food lists as well.

## 2024-12-03 ENCOUNTER — MYC MEDICAL ADVICE (OUTPATIENT)
Dept: FAMILY MEDICINE | Facility: CLINIC | Age: 44
End: 2024-12-03
Payer: COMMERCIAL

## 2024-12-26 ENCOUNTER — ALLIED HEALTH/NURSE VISIT (OUTPATIENT)
Dept: FAMILY MEDICINE | Facility: CLINIC | Age: 44
End: 2024-12-26
Payer: COMMERCIAL

## 2024-12-26 DIAGNOSIS — Z00.00 HEALTHCARE MAINTENANCE: Primary | ICD-10-CM

## 2025-03-25 ENCOUNTER — TELEPHONE (OUTPATIENT)
Dept: FAMILY MEDICINE | Facility: CLINIC | Age: 45
End: 2025-03-25
Payer: COMMERCIAL

## 2025-03-25 NOTE — TELEPHONE ENCOUNTER
03/25/25  The following message was received in the Pt CRM Request bucket:       Writer faxed colonoscopy referral to [facility] and LM on identified VM that pt can call [facility] to schedule colonoscopy.  Georgina

## 2025-04-17 ENCOUNTER — TRANSFERRED RECORDS (OUTPATIENT)
Dept: ADMINISTRATIVE | Facility: CLINIC | Age: 45
End: 2025-04-17
Payer: COMMERCIAL

## 2025-04-21 ENCOUNTER — TRANSFERRED RECORDS (OUTPATIENT)
Dept: ADMINISTRATIVE | Facility: CLINIC | Age: 45
End: 2025-04-21

## 2025-04-22 ENCOUNTER — NURSE TRIAGE (OUTPATIENT)
Dept: NURSING | Facility: CLINIC | Age: 45
End: 2025-04-22

## 2025-04-22 ENCOUNTER — HOSPITAL ENCOUNTER (OUTPATIENT)
Dept: CT IMAGING | Facility: CLINIC | Age: 45
Discharge: HOME | End: 2025-04-22
Attending: STUDENT IN AN ORGANIZED HEALTH CARE EDUCATION/TRAINING PROGRAM | Admitting: STUDENT IN AN ORGANIZED HEALTH CARE EDUCATION/TRAINING PROGRAM
Payer: COMMERCIAL

## 2025-04-22 ENCOUNTER — PATIENT OUTREACH (OUTPATIENT)
Dept: GASTROENTEROLOGY | Facility: CLINIC | Age: 45
End: 2025-04-22
Payer: COMMERCIAL

## 2025-04-22 DIAGNOSIS — C20 RECTAL CANCER (H): ICD-10-CM

## 2025-04-22 LAB — RADIOLOGIST FLAGS: ABNORMAL

## 2025-04-22 PROCEDURE — 250N000011 HC RX IP 250 OP 636: Performed by: STUDENT IN AN ORGANIZED HEALTH CARE EDUCATION/TRAINING PROGRAM

## 2025-04-22 PROCEDURE — 71260 CT THORAX DX C+: CPT

## 2025-04-22 RX ORDER — IOPAMIDOL 755 MG/ML
90 INJECTION, SOLUTION INTRAVASCULAR ONCE
Status: COMPLETED | OUTPATIENT
Start: 2025-04-22 | End: 2025-04-22

## 2025-04-22 RX ADMIN — IOPAMIDOL 90 ML: 755 INJECTION, SOLUTION INTRAVENOUS at 09:07

## 2025-04-22 NOTE — TELEPHONE ENCOUNTER
Called Rocael; he states he was dx with colorectal cancer last Thursday.   He just completed the CT scan and talked with Colorectal surgery team and feeling ok with plan in place.     Reviewed his MRI for rectal cancer is on Friday - advised him to contact the [facility] to confirm if they add on the Liver MRI as well based on these CT results    He has appt with [facility] next week    He feels comfortable with game plan and plans to contact his doctor at [facility], coping well at this time.   Thanks,   Dr. Monahan

## 2025-04-22 NOTE — PROCEDURES
[facility]   [address], [address], [place] [address]     Patient Name: Rocael Amos  Gender:  Male  Exam Date: 04/17/2025 Visit Number:  [id number]  Age: 44 Years YOB: 1980  Attending MD: Elias Levy MD Medical Record#:  [id number]    Procedure: Colonoscopy   Indications: Colorectal cancer screening      Referring MD: Porsha Monahan MD  Primary MD:      Porsha Monahan MD  Medications: Admitting Medications:   0.9% Normal Saline at TKO   Intra Procedure Medications:   Patient received monitored anesthesia care.      Admitting Medications:   0.9% Normal Saline at TKO   Intra Procedure Medications:   Patient received monitored anesthesia care.     Complications: No immediate complications  ______________________________________________________________________________  Procedure:   An examination of the heart and lungs was performed and found to be within acceptable limits.  .  The patient was therefore deemed a reasonable candidate for endoscopy and sedation.   The risks and benefits of the procedure were explained to the patient.After obtaining informed consent, the patient received monitored anesthesia care and I passed the scope   without difficulty via the rectum to the ileum.  The appendiceal orifice and ic valve were identified.  The scope was retroflexed during the examination  The quality of the prep was good  (Miralax/Gatorade/2 tablets Bisacodyl/Magnesium Citrate).    This was a complete examination throughout the entire colon.    Findings:    Normal finding.  Location - ileum.    Polyp location: ascending colon.  Quantity: 1.  Size: 5 mm.  Polyp shape:  sessile.         Maneuver: polypectomy was performed with a cold snare.       Removal:  complete.  Retrieval: complete.  Bleeding: none.     Probable colon cancer.  Location: rectum , 5 centimeters from the anal verge.  Rolette: yes.  Size: 10 cm.  Attributes: circumferential, bleeding, ulcerated   Manuever: cold biopsy forceps.  2 mL of Spot injected at distal margin for tattooing    Impression:  Colorectal polyps  Rectal mass  MD impression comments:  CT scan of the chest, abdomen and pelvis  MRI pelvis  Colorectal surgery consultation  Oncology consultation  CEA level  CT scan of the chest, abdomen and pelvis    Preliminary Plan:  The patient and their physician will receive a copy of the pathology report as well as pathology-based recommendations for future screening or surveillance.  Return to your primary care provider as needed.  Pathology Results:  A: COLON, ASCENDING, POLYP:           1. Tubular adenoma           2. Negative for high grade dysplasia           3. Per the colonoscopy report:               a. Polyp size: 5 mm               b. Resection: Complete               c. Retrieval: Complete      B: RECTUM, MASS 5 CM FROM ANAL VERGE, BIOPSY:           1. Adenocarcinoma, moderately differentiated           2. Background tubulovillous adenoma           3. DNA mismatch repair enzyme immunohistochemistry is pending, an updated report will follow (ordered 04/18/2025)      COMMENTS  B. Please order CEA level and CT imaging (chest/abdomen/pelvis) prior to treatment. Genetic counseling is recommended for colon cancer patients under age 50. Dr. Heath notified Dr. Levy via text message, 04/18/2025.  This part was seen in consultation with Dr. Durham.  Please contact us with any questions ([facility] GI pathology Service, [facility] extension  [phone]).      MICROSCOPIC  A: Performed   B: Performed   SPECIAL STAINING/DEEPER  B: IHC Stains: DNA MMR (MLH1; MSH2; MSH6; PMS2)     Electronically signed by: Wild Heath MD    Interpreted at [facility], [address] [place] [id number]    Orders    Diagnostics:  Procedure Comments Timeframe Assessment   CT Abdomen And Pelvis With Contrast Per Radiology  First Available K62.89   CT Chest WITHOUT And WITH Contrast Contrast per  radiology First Available K62.89   MRI Pelvis WITHOUT And WITH Contrast Contrast per radiology First Available K62.89     Instruction(s)/Education:  Instruction/Education Timeframe Assessment   Colon Cancer  K63.5   Colon Polyps  K63.5   Colon Polyps  K63.5     Follow-up visit/Referral:  Order Comments   referred to [facility] Newly diagnosed rectal cancer    referred to Long Fletcher MD Colon and Rectal Surgery Newly diagnosed rectal cancer      Additional Comments:  Please proceed with the imaging studies including the CT scan of the abdomen and chest, along with the MRI of the rectum.  Also, please proceed with clinic visit with the colorectal surgery team as well as the oncology team.      _Electronically signed by:___________________  Elias Levy MD                 04/17/2025    cc: Porsha Monahan MD  cc: Porsha Monahan MD

## 2025-04-22 NOTE — TELEPHONE ENCOUNTER
Scheduling called with Critical imaging result of a CT chest/ abdomen. Per scheduling they want MD to see results ASAP.   Ordering provider is Long Fletcher MD with [facility].     IMPRESSION:  1.  Primary rectal tumor with right, mesorectal  and superior mesocolic nodes are noted as described above.   2.  Rectal MRI can better define tumor if needed.  3.  There are 2 hypodense liver lesions measuring at most a centimeter that are of concern.  4.  Suggest a liver MRI for further evaluation.   5.  MRI can also assess the presumed ovoid cyst in the pancreatic neck, likely an incidental IPMN.  6.  There is a punctate right middle lobe nodule which is likely benign and can be followed.  7.  No other sites of concern for metastasis.    Reason for Disposition   Lab or radiology calling with CRITICAL test results    Protocols used: PCP Call - No Triage-A-

## 2025-04-22 NOTE — PROCEDURES
2025        Rocael Bette   [address] [place] [id number]      Rocael Amos,  :  1980    I'm writing to let you know about the tests that were taken recently.   Thank you for allowing [facility] the opportunity to take part in your healthcare.  At [facility] we strive to provide each patient with the finest gastroenterology care available.  We hope your experience was pleasant and informative.    Normal blood test, please proceed with the imaging studies and the clinic visit with the oncology and colorectal surgery teams.    CEA 2025 14:58   Description Result Units Flags Range   CEA 1.5 ng/mL  0.0-4.7   Comments   Performed At: , [facility]  [address] [facility], [place], [place], [id number]  Romy Martinez MD, Phone: [phone]   CEA:                                              Nonsmokers          <3.9                                              Smokers             <5.6                                                                      .                 Roche [employer]ostics Electrochemiluminescence Immunoassay                 (ECLIA)                                                                      .                 Values obtained with different assay methods or kits                 cannot be used interchangeably.  Results cannot be                 interpreted as absolute evidence of the presence or                 absence of malignant disease.   CBC With Differential/Platelet 2025 14:58   Description Result Units Flags Range   Hemoglobin 17.0 g/dL  13.0-17.7   Hematocrit 50.9 %  37.5-51.0   MCV 89 fL  79-97   MCHC 33.4 g/dL  31.5-35.7   MCH 29.6 pg  26.6-33.0   RDW 12.5 %  11.6-15.4   Platelets 235 x10E3/uL  150-450   Neutrophils 70 %  Not Estab.   Lymphs 23 %  Not Estab.   Monocytes 7 %  Not Estab.   Eos 0 %  Not Estab.   Basos 0 %  Not Estab.   Neutrophils (Absolute) 6.4 x10E3/uL  1.4-7.0   Lymphs (Absolute) 2.1 x10E3/uL  0.7-3.1   Monocytes(Absolute) 0.6 x10E3/uL  0.1-0.9    Eos (Absolute) 0.0 x10E3/uL  0.0-0.4   Baso (Absolute) 0.0 x10E3/uL  0.0-0.2   Immature Grans (Abs) 0.0 x10E3/uL  0.0-0.1   Immature Granulocytes 0 %  Not Estab.   RBC 5.74 x10E6/uL  4.14-5.80   WBC 9.2 x10E3/uL  3.4-10.8   Comments   Performed At: , [facility]  [address] [place] [address], [id number]  Romy Martinez MD, Phone: [phone]         I hope you are feeling well.        Thank you.    Electronically signed by:  Elias Levy MD 04/21/2025 10:19 AM  Document generated by:  Elias Levy MD  04/21/2025  If your provider ordered multiple tests; the results may not become available at the same time.  If multiple test results are received within 14 days of one another, you may receive a duplicate.  cc:  Porsha Monahan MD

## 2025-05-05 NOTE — PROGRESS NOTES
Interventional Radiology - Pre Procedure Chart Review  [facility]  May 6, 2025    Procedure Requested: Port placement  Requested by: Dr. Corona    History and Physical Reviewed: H&P documented within 30 days (by Dr. Ramírez on 5/2/2025).  I have personally reviewed the patient's medical history and have updated the medical record as necessary.    HPI: This is a 44 year old male with a recent history of abdominal pain and new diagnosis of rectal cancer.    Clinical notes reviewed    Pertinent medications reviewed:   Anticoagulation: none per chart review   Antibiotic: Ancef 2 grams IV ordered for procedure    Allergies   Allergen Reactions    Kiwi [Kiwi Extract] Anaphylaxis     Throat felt like it was closing and eyes were swollen       EXAM:  There were no vitals taken for this visit.  Not assessed    LABS:  Lab Results   Component Value Date    WBC 8.3 04/11/2025    HGB 16.7 04/11/2025     04/11/2025     PLAN:  Planning for Port placement in IR.     Radiologist to further review procedure with patient.    EMR reviewed. No formal assessment completed.     Total time: 15 minutes     SULEIMAN Huddleston CNP  Interventional Radiology

## 2025-05-06 ENCOUNTER — HOSPITAL ENCOUNTER (OUTPATIENT)
Dept: INTERVENTIONAL RADIOLOGY/VASCULAR | Facility: CLINIC | Age: 45
Discharge: HOME | End: 2025-05-06
Attending: INTERNAL MEDICINE | Admitting: RADIOLOGY
Payer: COMMERCIAL

## 2025-05-06 VITALS
DIASTOLIC BLOOD PRESSURE: 93 MMHG | RESPIRATION RATE: 21 BRPM | HEART RATE: 73 BPM | SYSTOLIC BLOOD PRESSURE: 144 MMHG | OXYGEN SATURATION: 96 % | TEMPERATURE: 98.7 F

## 2025-05-06 DIAGNOSIS — C20 RECTAL CANCER (H): ICD-10-CM

## 2025-05-06 PROCEDURE — 77001 FLUOROGUIDE FOR VEIN DEVICE: CPT

## 2025-05-06 PROCEDURE — 250N000009 HC RX 250: Performed by: RADIOLOGY

## 2025-05-06 PROCEDURE — C1788 PORT, INDWELLING, IMP: HCPCS

## 2025-05-06 PROCEDURE — 250N000011 HC RX IP 250 OP 636: Performed by: NURSE PRACTITIONER

## 2025-05-06 PROCEDURE — 250N000011 HC RX IP 250 OP 636: Mod: JZ | Performed by: RADIOLOGY

## 2025-05-06 PROCEDURE — C1769 GUIDE WIRE: HCPCS

## 2025-05-06 PROCEDURE — 250N000009 HC RX 250: Performed by: NURSE PRACTITIONER

## 2025-05-06 RX ORDER — HEPARIN SODIUM 200 [USP'U]/100ML
1 INJECTION, SOLUTION INTRAVENOUS CONTINUOUS PRN
Status: DISCONTINUED | OUTPATIENT
Start: 2025-05-06 | End: 2025-05-07 | Stop reason: HOSPADM

## 2025-05-06 RX ORDER — CEFAZOLIN SODIUM/WATER 2 G/20 ML
2 SYRINGE (ML) INTRAVENOUS
Status: COMPLETED | OUTPATIENT
Start: 2025-05-06 | End: 2025-05-06

## 2025-05-06 RX ORDER — NALOXONE HYDROCHLORIDE 0.4 MG/ML
0.4 INJECTION, SOLUTION INTRAMUSCULAR; INTRAVENOUS; SUBCUTANEOUS
Status: DISCONTINUED | OUTPATIENT
Start: 2025-05-06 | End: 2025-05-07 | Stop reason: HOSPADM

## 2025-05-06 RX ORDER — LIDOCAINE HYDROCHLORIDE AND EPINEPHRINE 10; 10 MG/ML; UG/ML
1-20 INJECTION, SOLUTION INFILTRATION; PERINEURAL ONCE
Status: DISCONTINUED | OUTPATIENT
Start: 2025-05-06 | End: 2025-05-07 | Stop reason: HOSPADM

## 2025-05-06 RX ORDER — FLUMAZENIL 0.1 MG/ML
0.2 INJECTION, SOLUTION INTRAVENOUS
Status: DISCONTINUED | OUTPATIENT
Start: 2025-05-06 | End: 2025-05-07 | Stop reason: HOSPADM

## 2025-05-06 RX ORDER — LIDOCAINE 40 MG/G
CREAM TOPICAL
Status: DISCONTINUED | OUTPATIENT
Start: 2025-05-06 | End: 2025-05-07 | Stop reason: HOSPADM

## 2025-05-06 RX ORDER — DEXTROSE MONOHYDRATE 25 G/50ML
25-50 INJECTION, SOLUTION INTRAVENOUS
Status: DISCONTINUED | OUTPATIENT
Start: 2025-05-06 | End: 2025-05-07 | Stop reason: HOSPADM

## 2025-05-06 RX ORDER — HEPARIN SODIUM (PORCINE) LOCK FLUSH IV SOLN 100 UNIT/ML 100 UNIT/ML
5 SOLUTION INTRAVENOUS ONCE
Status: COMPLETED | OUTPATIENT
Start: 2025-05-06 | End: 2025-05-06

## 2025-05-06 RX ORDER — SODIUM CHLORIDE 9 MG/ML
INJECTION, SOLUTION INTRAVENOUS CONTINUOUS
Status: DISCONTINUED | OUTPATIENT
Start: 2025-05-06 | End: 2025-05-07 | Stop reason: HOSPADM

## 2025-05-06 RX ORDER — NALOXONE HYDROCHLORIDE 0.4 MG/ML
0.2 INJECTION, SOLUTION INTRAMUSCULAR; INTRAVENOUS; SUBCUTANEOUS
Status: DISCONTINUED | OUTPATIENT
Start: 2025-05-06 | End: 2025-05-07 | Stop reason: HOSPADM

## 2025-05-06 RX ORDER — HEPARIN SODIUM,PORCINE 10 UNIT/ML
5-10 VIAL (ML) INTRAVENOUS
OUTPATIENT
Start: 2025-05-06

## 2025-05-06 RX ORDER — HEPARIN SODIUM,PORCINE 10 UNIT/ML
5-10 VIAL (ML) INTRAVENOUS EVERY 24 HOURS
OUTPATIENT
Start: 2025-05-06

## 2025-05-06 RX ORDER — NICOTINE POLACRILEX 4 MG
15-30 LOZENGE BUCCAL
Status: DISCONTINUED | OUTPATIENT
Start: 2025-05-06 | End: 2025-05-07 | Stop reason: HOSPADM

## 2025-05-06 RX ORDER — HEPARIN SODIUM (PORCINE) LOCK FLUSH IV SOLN 100 UNIT/ML 100 UNIT/ML
5-10 SOLUTION INTRAVENOUS
OUTPATIENT
Start: 2025-05-06

## 2025-05-06 RX ORDER — FENTANYL CITRATE 50 UG/ML
25-50 INJECTION, SOLUTION INTRAMUSCULAR; INTRAVENOUS EVERY 5 MIN PRN
Status: DISCONTINUED | OUTPATIENT
Start: 2025-05-06 | End: 2025-05-07 | Stop reason: HOSPADM

## 2025-05-06 RX ORDER — ACETAMINOPHEN 325 MG/1
650 TABLET ORAL
OUTPATIENT
Start: 2025-05-06

## 2025-05-06 RX ADMIN — Medication 2 G: at 13:00

## 2025-05-06 RX ADMIN — LIDOCAINE HYDROCHLORIDE 30 ML: 10 INJECTION, SOLUTION EPIDURAL; INFILTRATION; INTRACAUDAL; PERINEURAL at 14:28

## 2025-05-06 RX ADMIN — FENTANYL CITRATE 50 MCG: 50 INJECTION INTRAMUSCULAR; INTRAVENOUS at 14:20

## 2025-05-06 RX ADMIN — MIDAZOLAM HYDROCHLORIDE 1 MG: 1 INJECTION, SOLUTION INTRAMUSCULAR; INTRAVENOUS at 14:31

## 2025-05-06 RX ADMIN — MIDAZOLAM HYDROCHLORIDE 1 MG: 1 INJECTION, SOLUTION INTRAMUSCULAR; INTRAVENOUS at 14:20

## 2025-05-06 RX ADMIN — SODIUM CHLORIDE 100 ML: 0.9 INJECTION, SOLUTION INTRAVENOUS at 14:25

## 2025-05-06 RX ADMIN — FENTANYL CITRATE 50 MCG: 50 INJECTION INTRAMUSCULAR; INTRAVENOUS at 14:31

## 2025-05-06 RX ADMIN — HEPARIN SODIUM (PORCINE) LOCK FLUSH IV SOLN 100 UNIT/ML 5 ML: 100 SOLUTION at 14:36

## 2025-05-06 NOTE — PROGRESS NOTES
Patient Name: Rocael Amos  Medical Record Number: [id number]  Today's Date: 5/6/2025    Procedure: Image guided chest port placement with moderate sedation  Proceduralist:     Procedure Start: 1420  Procedure end: 1435  Sedation medications administered: 2 mg midazolam and 100 mcg fentanyl   Sedation time: 15 minutes    Report given to: N/A  : N/A    Other Notes: Pt arrived to IR room 1 from Pre/post bay 2. Consent reviewed. Pt denies any questions or concerns regarding procedure. Pt positioned supine and monitored per protocol. Pt tolerated procedure without any noted complications. VSS on monitor. Pt transferred back to Pre/post bay 2. Discharge instructions explained to patient.  Patient escorted to car via wheelchair, friend Malcolm to transport.

## 2025-05-06 NOTE — DISCHARGE INSTRUCTIONS
Port Placement Procedure Discharge Instructions:  You had a port placed. A port is a small medical device that is placed under the skin and is connected to a vein with a catheter (thin, flexible tube). Ports can be used to administer IV medications (including chemotherapy), fluids or blood products or for blood lab draws. Please follow the below instructions after your procedure:    Care Instructions:  - If you received sedation for your procedure, do not drive or operate heavy machinery for the rest of the day.  - You may shower beginning tomorrow (post procedure day #1). Do not scrub site until well healed; pat dry gently with a towel.  - You likely have skin adhesive over your port site. Skin adhesive works like a bandage to keep the site covered and protected. Do not use antibiotic ointment or creams/lotions over adhesive as it can break it down. The skin adhesive will peel off on its own (typically in 5-14 days).  - Avoid submerging the port site under water (ex: tub baths, Jacuzzis, lakes, hot tubs and pools) for 10 days or until your site is well healed.  - You may have some discomfort, minimal swelling, redness and/or bruising at your port site/procedure site. You may take over the counter pain medication for discomfort (follow the package directions) or apply an ice pack wrapped in a towel over the site (rotating 20 minutes with ice pack on and 20 minutes with ice pack off) for comfort as needed. It can take several days for these to resolve.  - Avoid heavy lifting (greater than 10 pounds) and strenuous activities for 2 days following your procedure.   - If you experience significant bleeding at site, apply pressure with hands above the clavicle bone, sit upright and seek immediate medical assistance.  - Ports need to be flushed approximately every 4-6 weeks, if not being used more frequently. Follow up with the provider who ordered your port placement for further instructions for this.    Seek medical  evaluation or contact [place] [facility] RN [facility] at [phone] if you experience the following:  - Uncontrolled bleeding from port site  - Fever (greater than 101 F (38.3C))  - Purulent (yellow/green/foul smelling) drainage from port insertion site  - Increasing pain at port site  - Increasing redness at port site          * Recovery After Conscious Sedation (Adult)  We gave you medicine by vein to make you sleepy or relaxed during your procedure. This may have included both a pain medicine and sleeping medicine. Most of the effects have worn off. But you may still feel sleepy for the next 6 to 8 hours.  Home care  Follow these guidelines when you get home:  You may feel sleepy and clumsy and have poor balance for the next few hours.  A responsible adult should stay with you for the next 8 hours. This person should make sure your condition doesn t get worse.  Don't drink any alcohol for the next 24 hours.  Don't drive, operate dangerous machinery, make important business or personal decisions or sign legal documents during the next 24 hours.  You may vomit (throw up) if you eat too soon after the procedure. If this happens, drink small amounts of water, juice or clear broth. Wait to try solid food until you no longer have nausea (upset stomach).  Note: Your care team may tell you not to take any medicine by mouth for pain or sleep in the next 4 hours. These medicines may react with the medicines you had in the hospital. This could cause a much stronger response than usual.  Follow-up care  Follow up with your care team if you are not alert and back to your usual level of activity within 12 hours.  When to seek medical advice  Call your care team right away if any of these occur:  You still feel sleepy or clumsy after 12 hours, or your sleepiness gets worse  Weakness or dizziness gets worse  Repeated vomiting  If you can't be woken up and someone is staying with you, they should call 911.  For informational purposes only.  Not to replace the advice of your health care provider.  Copyright   2018 [employer]. All rights reserved.

## 2025-05-06 NOTE — PRE-PROCEDURE
GENERAL PRE-PROCEDURE:   Procedure:  Port placement  Date/Time:  5/6/2025 1:29 PM    Written consent obtained?: Yes    Risks and benefits: Risks, benefits and alternatives were discussed    Consent given by:  Patient  Patient states understanding of procedure being performed: Yes    Patient's understanding of procedure matches consent: Yes    Procedure consent matches procedure scheduled: Yes    Expected level of sedation:  Moderate  Appropriately NPO:  Yes  ASA Class:  1  Mallampati  :  Grade 2- soft palate, base of uvula, tonsillar pillars, and portion of posterior pharyngeal wall visible  Lungs:  Lungs clear with good breath sounds bilaterally  Heart:  Normal heart sounds and rate  History & Physical reviewed:  History and physical reviewed and no updates needed  Statement of review:  I have reviewed the lab findings, diagnostic data, medications, and the plan for sedation

## 2025-05-06 NOTE — PROCEDURES
[facility]    Procedure: IR Procedure Note    Date/Time: 5/6/2025 2:38 PM    Performed by: Emiliano Sanders MD  Authorized by: Emiliano Sanders MD  IR Fellow Physician:    Pre Procedure Diagnosis: port placement  Post Procedure Diagnosis: same    UNIVERSAL PROTOCOL   Site Marked: Yes  Prior Images Obtained and Reviewed:  NA  Required items: Required blood products, implants, devices and special equipment available    Patient identity confirmed:  Hospital-assigned identification number, arm band, provided demographic data and verbally with patient  Patient was reevaluated immediately before administering moderate or deep sedation or anesthesia  Confirmation Checklist:  Patient's identity using two indicators, procedure was appropriate and matched the consent or emergent situation, correct equipment/implants were available and relevant allergies  Time out: Immediately prior to the procedure a time out was called    Universal Protocol: the Joint Commission Universal Protocol was followed    Preparation: Patient was prepped and draped in usual sterile fashion      SEDATION  Patient Sedated: Yes    Vital signs: Vital signs monitored during sedation    Findings: SL port right internal jugular vein, tip at cavoatrial junction.    Procedural Complications: None      PROCEDURE  Describe Procedure: SL port right internal jugular vein, tip at cavoatrial junction.  Length of time physician/provider present for 1:1 monitoring during sedation:  0-22 min

## 2025-05-18 ENCOUNTER — MYC MEDICAL ADVICE (OUTPATIENT)
Dept: FAMILY MEDICINE | Facility: CLINIC | Age: 45
End: 2025-05-18
Payer: COMMERCIAL

## 2025-05-19 ENCOUNTER — TRANSFERRED RECORDS (OUTPATIENT)
Dept: HEALTH INFORMATION MANAGEMENT | Facility: CLINIC | Age: 45
End: 2025-05-19
Payer: COMMERCIAL

## 2025-06-02 ENCOUNTER — TRANSFERRED RECORDS (OUTPATIENT)
Dept: HEALTH INFORMATION MANAGEMENT | Facility: CLINIC | Age: 45
End: 2025-06-02
Payer: COMMERCIAL

## 2025-06-16 ENCOUNTER — TRANSFERRED RECORDS (OUTPATIENT)
Dept: HEALTH INFORMATION MANAGEMENT | Facility: CLINIC | Age: 45
End: 2025-06-16
Payer: COMMERCIAL

## 2025-07-14 ENCOUNTER — TRANSFERRED RECORDS (OUTPATIENT)
Dept: HEALTH INFORMATION MANAGEMENT | Facility: CLINIC | Age: 45
End: 2025-07-14
Payer: COMMERCIAL

## 2025-07-21 ENCOUNTER — OFFICE VISIT (OUTPATIENT)
Dept: FAMILY MEDICINE | Facility: CLINIC | Age: 45
End: 2025-07-21
Payer: COMMERCIAL

## 2025-07-21 VITALS
SYSTOLIC BLOOD PRESSURE: 152 MMHG | HEART RATE: 88 BPM | DIASTOLIC BLOOD PRESSURE: 100 MMHG | RESPIRATION RATE: 18 BRPM | TEMPERATURE: 97.7 F | OXYGEN SATURATION: 99 % | WEIGHT: 222.25 LBS | HEIGHT: 69 IN | BODY MASS INDEX: 32.92 KG/M2

## 2025-07-21 DIAGNOSIS — C20 ADENOCARCINOMA OF RECTUM METASTATIC TO LIVER (H): ICD-10-CM

## 2025-07-21 DIAGNOSIS — Z01.818 PREOP GENERAL PHYSICAL EXAM: Primary | ICD-10-CM

## 2025-07-21 DIAGNOSIS — R03.0 ELEVATED BP WITHOUT DIAGNOSIS OF HYPERTENSION: ICD-10-CM

## 2025-07-21 DIAGNOSIS — C78.7 ADENOCARCINOMA OF RECTUM METASTATIC TO LIVER (H): ICD-10-CM

## 2025-07-21 LAB
ERYTHROCYTE [DISTWIDTH] IN BLOOD BY AUTOMATED COUNT: 14.9 % (ref 10–15)
HCT VFR BLD AUTO: 44.6 % (ref 40–53)
HGB BLD-MCNC: 15.4 G/DL (ref 13.3–17.7)
MCH RBC QN AUTO: 30.1 PG (ref 26.5–33)
MCHC RBC AUTO-ENTMCNC: 34.5 G/DL (ref 31.5–36.5)
MCV RBC AUTO: 87 FL (ref 78–100)
PLATELET # BLD AUTO: 183 10E3/UL (ref 150–450)
RBC # BLD AUTO: 5.12 10E6/UL (ref 4.4–5.9)
WBC # BLD AUTO: 4.7 10E3/UL (ref 4–11)

## 2025-07-21 PROCEDURE — 99214 OFFICE O/P EST MOD 30 MIN: CPT | Performed by: FAMILY MEDICINE

## 2025-07-21 PROCEDURE — 85027 COMPLETE CBC AUTOMATED: CPT | Performed by: FAMILY MEDICINE

## 2025-07-21 PROCEDURE — 36415 COLL VENOUS BLD VENIPUNCTURE: CPT | Performed by: FAMILY MEDICINE

## 2025-07-21 NOTE — PATIENT INSTRUCTIONS
How to Take Your Medication Before Surgery  Preoperative Medication Instructions   Antiplatelet or Anticoagulation Medication Instructions   - We reviewed the medication list and the patient is not on an antiplatelet or anticoagulation medications.    Additional Medication Instructions  We reviewed the medication list and there are no chronic medications that need to be adjusted for this procedure.       Patient Education   Preparing for Your Surgery  For Adults  Getting started  In most cases, a nurse will call to review your health history and instructions. They will give you an arrival time based on your scheduled surgery time. Please be ready to share:  Your doctor's clinic name and phone number  Your medical, surgical, and anesthesia history  A list of allergies and sensitivities  A list of medicines, including herbal treatments and over-the-counter drugs  Whether the patient has a legal guardian (ask how to send us the papers in advance)  Note: You may not receive a call if you were seen at our PAC (Preoperative Assessment Center).  Please tell us if you're pregnant--or if there's any chance you might be pregnant. Some surgeries may injure a fetus (unborn baby), so they require a pregnancy test. Surgeries that are safe for a fetus don't always need a test, and you can choose whether to have one.   Preparing for surgery  Within 10 to 30 days of surgery: Have a pre-op exam (sometimes called an H&P, or History and Physical). This can be done at a clinic or pre-operative center.  If you're having a , you may not need this exam. Talk to your care team.  At your pre-op exam, talk to your care team about all medicines you take. (This includes CBD oil and any drugs, such as THC, marijuana, and other forms of cannabis.) If you need to stop any medicine before surgery, ask when to start taking it again.  This is for your safety. Many medicines and drugs can make you bleed too much during surgery. Some change  how well surgery (anesthesia) drugs work.  Call your insurance company to let them know you're having surgery. (If you don't have insurance, call [phone].)  Call your clinic if there's any change in your health. This includes a scrape or scratch near the surgery site, or any signs of a cold (sore throat, runny nose, cough, rash, fever).  Eating and drinking guidelines  For your safety: Unless your surgeon tells you otherwise, follow the guidelines below.  Eat and drink as normal until 8 hours before you arrive for surgery. After that, no food or milk. You can spit out gum when you arrive.  Drink clear liquids until 2 hours before you arrive. These are liquids you can see through, like water, Gatorade, and Propel Water. They also include plain black coffee and tea (no cream or milk).  No alcohol for 24 hours before you arrive. The night before surgery, stop any drinks that contain THC.  If your care team tells you to take medicine on the morning of surgery, it's okay to take it with a sip of water. No other medicines or drugs are allowed (including CBD oil)--follow your care team's instructions.  If you have questions the day of surgery, call your hospital or surgery center.   Preventing infection  Shower or bathe the night before and the morning of surgery. Follow the instructions your clinic gave you. (If no instructions, use regular soap.)  Don't shave or clip hair near your surgery site. We'll remove the hair if needed.  Don't smoke or vape the morning of surgery. No chewing tobacco for 6 hours before you arrive. A nicotine patch is okay. You may spit out nicotine gum when you arrive.  For some surgeries, the surgeon will tell you to fully quit smoking and nicotine.  We will make every effort to keep you safe from infection. We will:  Clean our hands often with soap and water (or an alcohol-based hand rub).  Clean the skin at your surgery site with a special soap that kills germs.  Give you a special gown to  keep you warm. (Cold raises the risk of infection.)  Wear hair covers, masks, gowns, and gloves during surgery.  Give antibiotic medicine, if prescribed. Not all surgeries need this medicine.  What to bring on the day of surgery  Photo ID and insurance card  Copy of your health care directive, if you have one  Glasses and hearing aids (bring cases)  You can't wear contacts during surgery  Inhaler and eye drops, if you use them (tell us about these when you arrive)  CPAP machine or breathing device, if you use them  A few personal items, if spending the night  If you have . . .  A pacemaker, ICD (cardiac defibrillator), or other implant: Bring the ID card.  An implanted stimulator: Bring the remote control.  A legal guardian: Bring a copy of the certified (court-stamped) guardianship papers.  Please remove any jewelry, including body piercings. Leave jewelry and other valuables at home.  If you're going home the day of surgery  You must have a support person drive you home. They should stay with you overnight, and they may need to help with your self-care.  If you don't have a support person, please tells us as soon as possible. We can help.  After surgery  If it's hard to control your pain or you need more pain medicine, please call your surgeon's office.  Questions?   If you have any questions for your care team, list them here:   ____________________________________________________________________________________________________________________________________________________________________________________________________________________________________________________________  For informational purposes only. Not to replace the advice of your health care provider. Copyright   2003, 2019 [facility]. All rights reserved. Clinically reviewed by Sunday Berry MD. [employer] [id number] - REV 02/25.

## 2025-07-21 NOTE — PROGRESS NOTES
"  {PROVIDER CHARTING PREFERENCE:350184}    Subjective   Rocael is a 45 year old, presenting for the following health issues:  Follow Up and Pre-Op Exam (Spine biopsy)        7/21/2025    11:34 AM   Additional Questions   Roomed by PERRY Marks   Accompanied by self     History of Present Illness       Reason for visit:  Followup    Rocael Amos, 45 years    Metastatic Rectal Cancer  - Diagnosed with colon cancer at first screening during routine physical in November 2024  - Metastatic disease to the liver and swelling identified after diagnosis  - Under care with [facility] and colorectal surgery  - Completed 2 months of induction chemotherapy for metastatic rectal cancer to the liver    Possible Spinal Lesion  - Most recent imaging on July 10, 2025 raised concern for spinal lesion    {MA/LPN/RN Pre-Provider Visit Orders- hCG/UA/Strep (Optional):153911}  {SUPERLIST (Optional):977776}  {additonal problems for provider to add (Optional):950091}    {ROS Picklists (Optional):727475}      Objective    BP (!) 152/100   Pulse 88   Temp 97.7  F (36.5  C)   Resp 18   Ht 1.753 m (5' 9\")   Wt 100.8 kg (222 lb 4 oz)   SpO2 99%   BMI 32.82 kg/m    Body mass index is 32.82 kg/m .  Physical Exam   {Exam List (Optional):888265}    {Diagnostic Test Results (Optional):840748}        Signed Electronically by: Porsha Monahan MD  {Email feedback regarding this note to [email or web address]   :044691}  "

## 2025-07-21 NOTE — PROGRESS NOTES
Preoperative Evaluation  [facility]  [address] [address]  Phone: [phone]  Fax: [phone]  Primary Provider: Porsha Monahan MD  Pre-op Performing Provider: Porsha Monahan MD  Jul 21, 2025 7/21/2025   Surgical Information   What procedure is being done? spine biopsy    Facility or Hospital where procedure/surgery will be performed: [facility]    Who is doing the procedure / surgery? TBD    Date of surgery / procedure: 7/23/25    Time of surgery / procedure: TBD    Where do you plan to recover after surgery? at home with family        Proxy-reported     Fax number for surgical facility: [facility] [phone]    Assessment & Plan     The proposed surgical procedure is considered INTERMEDIATE risk.      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 No acute issues identified on exam.  - Proceed with preoperative planning as indicated  - CBC today    Elevated BP without diagnosis of hypertension  Assessment: Elevated blood pressure noted without a formal diagnosis of hypertension. Has had normal Bps 120s/70s at home and at his chemo/oncology appts. For now we can continue to monitor.   - Monitor blood pressure at future visits    Consent was obtained from the patient to use an AI documentation tool in the creation of this note.       - No identified additional risk factors other than previously addressed    Preoperative Medication Instructions  Antiplatelet or Anticoagulation Medication Instructions   - We reviewed the medication list and the patient is not on an antiplatelet or anticoagulation medications.    Additional Medication Instructions  We reviewed the medication list and there are no chronic medications that need to be adjusted for this  procedure.    Recommendation  Approval given to proceed with proposed procedure, without further diagnostic evaluation.    Follow-up for next pre op exam as needed or for routine physical in Nov      Subjective   Rocael is a 45 year old, presenting for the following:  Follow Up and Pre-Op Exam (Spine biopsy)          7/21/2025    11:34 AM   Additional Questions   Roomed by PERRY Marks   Accompanied by self   HPI: Rocael Amos, 45 years   Here for preoperative exam before spinal lesion biopsy     Metastatic Rectal Cancer  - Diagnosed with colon cancer (10cm adenocarcionma, rectal) at first screening scope in April; no family history. Only had one episode of bloody stools the week before the scope; otherwise no other sx.   - Metastatic disease to the liver identified after diagnosis  - Under care with [facility] and colorectal surgery/[facility]  - Completed 5 rounds  of chemotherapy for metastatic rectal cancer to the liver  - He reviewed how initial days of the chemo regimen he will have the abdominal cramping and tightness sensation with the pump and then wants to sleep the next day but feels well enough to exercise and workout at the gym for the off weeks  - has worked with GI to review continued goals for diet/protein intake; they have had him stop all supplements for now    Possible Spinal Lesion  - Most recent imaging on July 10, 2025 raised concern for spinal lesion and  needs bx    Wt Readings from Last 3 Encounters:   07/21/25 100.8 kg (222 lb 4 oz)   05/02/25 97.8 kg (215 lb 9.6 oz)   04/11/25 98.9 kg (218 lb)             7/21/2025   Pre-Op Questionnaire   Have you ever had a heart attack or stroke? No    Have you ever had surgery on your heart or blood vessels, such as a stent placement, a coronary artery bypass, or surgery on an artery in your head, neck, heart, or legs? No    Do you have chest pain with activity? No    Do you have a history of heart failure? No    Do you currently have a cold,  bronchitis or symptoms of other infection? No    Do you have a cough, shortness of breath, or wheezing? No    Do you or anyone in your family have previous history of blood clots? (!) YES 2022 he had a DVT after a traumatic Achilles tendon rupture and completed 3-month course of anticoagulation    Do you or does anyone in your family have a serious bleeding problem such as prolonged bleeding following surgeries or cuts? No    Have you ever had problems with anemia or been told to take iron pills? No    Have you had any abnormal blood loss such as black, tarry or bloody stools? No    Have you ever had a blood transfusion? No    Are you willing to have a blood transfusion if it is medically needed before, during, or after your surgery? Yes    Have you or any of your relatives ever had problems with anesthesia? No    Do you have sleep apnea, excessive snoring or daytime drowsiness? No    Do you have any artifical heart valves or other implanted medical devices like a pacemaker, defibrillator, or continuous glucose monitor? No    Do you have artificial joints? No    Are you allergic to latex? No        Proxy-reported     Advance Care Planning    Discussed advance care planning with patient; however, patient declined at this time.    Preoperative Review of    reviewed - no record of controlled substances prescribed.since last July 2024.     Status of Chronic Conditions:  See problem list for active medical problems.  Problems all longstanding and stable, except as noted/documented.  See ROS for pertinent symptoms related to these conditions.    Patient Active Problem List    Diagnosis Date Noted    Rectal cancer (H) 04/22/2025     Priority: Medium    Personal history of DVT (deep vein thrombosis) 11/27/2024     Priority: Medium      10/17/2022 DVT right leg due to a recent Achilles tear. Completed 3 month course of Eliquis       Right shoulder pain 05/18/2014     Priority: Medium     Formatting of this note might  "be different from the original.  Scapular dyskinesis, and working on exercises for this as the primary cause of his symptoms.  Thorough work up with [facility] 5/2014 with MRI scans of the right shoulder showed some labral degeneration, and brachial plexus MRI negative for thoracic outlet syndrome as the cause of his symptoms.        Past Medical History:   Diagnosis Date    Right shoulder pain 5/18/2014    Overview:  Scapular dyskinesis, and working on exercises for this as the primary cause of his symptoms. Thorough work up with [facility] 5/2014 with MRI scans of the right shoulder showed some labral degeneration, and brachial plexus MRI negative for thoracic outlet syndrome as the cause of his symptoms.     Past Surgical History:   Procedure Laterality Date    HC KNEE SCOPE, DIAGNOSTIC      Description: Arthroscopy Knee Left;  Recorded: 09/18/2014;    IR CHEST PORT PLACEMENT > 5 YRS OF AGE  5/6/2025    SHOULDER SURGERY Right      No current outpatient medications on file.       Allergies   Allergen Reactions    Kiwi [Kiwi Extract] Anaphylaxis     Throat felt like it was closing and eyes were swollen        Social History     Tobacco Use    Smoking status: Never     Passive exposure: Never    Smokeless tobacco: Never   Substance Use Topics    Alcohol use: Yes     Comment: Alcoholic Drinks/day: 0-1/week     No family hx of colon cancer  History   Drug Use No               Objective    BP (!) 148/90   Pulse 88   Temp 97.7  F (36.5  C)   Resp 18   Ht 1.753 m (5' 9\")   Wt 100.8 kg (222 lb 4 oz)   SpO2 99%   BMI 32.82 kg/m     Estimated body mass index is 32.82 kg/m  as calculated from the following:    Height as of this encounter: 1.753 m (5' 9\").    Weight as of this encounter: 100.8 kg (222 lb 4 oz).  Physical Exam  GENERAL: alert and no distress  EYES: Eyes grossly normal to inspection, PERRL and conjunctivae and sclerae normal  HENT: ear canals and TM's normal, nose and mouth without ulcers " or lesions  NECK: no adenopathy, no asymmetry, masses, or scars  RESP: lungs clear to auscultation - no rales, rhonchi or wheezes  CV: regular rate and rhythm, normal S1 S2, no S3 or S4, no murmur, click or rub, no peripheral edema  ABDOMEN: soft, nontender  MS: no gross musculoskeletal defects noted, no edema  SKIN: no suspicious lesions or rashes  NEURO: Normal strength and tone, mentation intact and speech normal  PSYCH: mentation appears normal, affect normal/bright    Recent Labs   Lab Test 04/11/25  1152 11/27/24  0803   HGB 16.7 16.3     --     135   POTASSIUM 4.2 4.4   CR 1.10 1.05   A1C  --  5.4        Diagnostics  Labs pending at this time.  Results will be reviewed when available.   No EKG required, no history of coronary heart disease, significant arrhythmia, peripheral arterial disease or other structural heart disease.    Revised Cardiac Risk Index (RCRI)  The patient has the following serious cardiovascular risks for perioperative complications:     - No serious cardiac risks = 0 points     RCRI Interpretation: 0 points: Class I (very low risk - 0.4% complication rate)         Signed Electronically by: Porsha Monahan MD  A copy of this evaluation report is provided to the requesting physician.        Answers submitted by the patient for this visit:  General Questionnaire (Submitted on 7/21/2025)  Chief Complaint: Chronic problems general questions HPI Form  What is the reason for your visit today? : followup  Questionnaire about: Chronic problems general questions HPI Form (Submitted on 7/21/2025)  Chief Complaint: Chronic problems general questions HPI Form

## 2025-07-22 ENCOUNTER — TELEPHONE (OUTPATIENT)
Dept: FAMILY MEDICINE | Facility: CLINIC | Age: 45
End: 2025-07-22
Payer: COMMERCIAL

## 2025-08-06 ENCOUNTER — NURSE TRIAGE (OUTPATIENT)
Dept: FAMILY MEDICINE | Facility: CLINIC | Age: 45
End: 2025-08-06
Payer: COMMERCIAL

## 2025-08-11 ENCOUNTER — TRANSFERRED RECORDS (OUTPATIENT)
Dept: HEALTH INFORMATION MANAGEMENT | Facility: CLINIC | Age: 45
End: 2025-08-11
Payer: COMMERCIAL

## 2025-08-25 ENCOUNTER — TRANSFERRED RECORDS (OUTPATIENT)
Dept: HEALTH INFORMATION MANAGEMENT | Facility: CLINIC | Age: 45
End: 2025-08-25
Payer: COMMERCIAL

## (undated) RX ORDER — CEFAZOLIN SODIUM/WATER 2 G/20 ML
SYRINGE (ML) INTRAVENOUS
Status: DISPENSED
Start: 2025-05-06

## (undated) RX ORDER — LIDOCAINE HYDROCHLORIDE 10 MG/ML
INJECTION, SOLUTION EPIDURAL; INFILTRATION; INTRACAUDAL; PERINEURAL
Status: DISPENSED
Start: 2025-05-06

## (undated) RX ORDER — FENTANYL CITRATE 50 UG/ML
INJECTION, SOLUTION INTRAMUSCULAR; INTRAVENOUS
Status: DISPENSED
Start: 2025-05-06